# Patient Record
Sex: MALE | Race: ASIAN | NOT HISPANIC OR LATINO | URBAN - METROPOLITAN AREA
[De-identification: names, ages, dates, MRNs, and addresses within clinical notes are randomized per-mention and may not be internally consistent; named-entity substitution may affect disease eponyms.]

---

## 2019-08-01 ENCOUNTER — OUTPATIENT (OUTPATIENT)
Dept: OUTPATIENT SERVICES | Facility: HOSPITAL | Age: 74
LOS: 1 days | End: 2019-08-01
Payer: MEDICAID

## 2019-08-01 PROCEDURE — G9001: CPT

## 2019-08-15 ENCOUNTER — INPATIENT (INPATIENT)
Facility: HOSPITAL | Age: 74
LOS: 10 days | Discharge: HOME | DRG: 247 | End: 2019-08-26
Attending: HOSPITALIST | Admitting: STUDENT IN AN ORGANIZED HEALTH CARE EDUCATION/TRAINING PROGRAM
Payer: MEDICAID

## 2019-08-15 VITALS
OXYGEN SATURATION: 100 % | HEIGHT: 62 IN | RESPIRATION RATE: 14 BRPM | DIASTOLIC BLOOD PRESSURE: 84 MMHG | WEIGHT: 109.35 LBS | SYSTOLIC BLOOD PRESSURE: 184 MMHG | TEMPERATURE: 98 F | HEART RATE: 55 BPM

## 2019-08-15 DIAGNOSIS — I21.3 ST ELEVATION (STEMI) MYOCARDIAL INFARCTION OF UNSPECIFIED SITE: ICD-10-CM

## 2019-08-15 DIAGNOSIS — I21.19 ST ELEVATION (STEMI) MYOCARDIAL INFARCTION INVOLVING OTHER CORONARY ARTERY OF INFERIOR WALL: ICD-10-CM

## 2019-08-15 DIAGNOSIS — Z29.9 ENCOUNTER FOR PROPHYLACTIC MEASURES, UNSPECIFIED: ICD-10-CM

## 2019-08-15 DIAGNOSIS — I10 ESSENTIAL (PRIMARY) HYPERTENSION: ICD-10-CM

## 2019-08-15 DIAGNOSIS — R33.9 RETENTION OF URINE, UNSPECIFIED: ICD-10-CM

## 2019-08-15 LAB
ALBUMIN SERPL ELPH-MCNC: 4.1 G/DL — SIGNIFICANT CHANGE UP (ref 3.3–5)
ALP SERPL-CCNC: 83 U/L — SIGNIFICANT CHANGE UP (ref 40–120)
ALT FLD-CCNC: 22 U/L — SIGNIFICANT CHANGE UP (ref 10–45)
ANION GAP SERPL CALC-SCNC: 15 MMOL/L — SIGNIFICANT CHANGE UP (ref 5–17)
APTT BLD: >200 SEC — CRITICAL HIGH (ref 27.5–36.3)
AST SERPL-CCNC: 41 U/L — HIGH (ref 10–40)
BILIRUB SERPL-MCNC: 0.4 MG/DL — SIGNIFICANT CHANGE UP (ref 0.2–1.2)
BLD GP AB SCN SERPL QL: NEGATIVE — SIGNIFICANT CHANGE UP
BUN SERPL-MCNC: 14 MG/DL — SIGNIFICANT CHANGE UP (ref 7–23)
CALCIUM SERPL-MCNC: 9.6 MG/DL — SIGNIFICANT CHANGE UP (ref 8.4–10.5)
CHLORIDE SERPL-SCNC: 95 MMOL/L — LOW (ref 96–108)
CHOLEST SERPL-MCNC: 260 MG/DL — HIGH (ref 10–199)
CK MB BLD-MCNC: 2.4 % — SIGNIFICANT CHANGE UP (ref 0–3.5)
CK MB CFR SERPL CALC: 2.3 NG/ML — SIGNIFICANT CHANGE UP (ref 0–6.7)
CK MB CFR SERPL CALC: 2.5 NG/ML — SIGNIFICANT CHANGE UP (ref 0–6.7)
CK SERPL-CCNC: 106 U/L — SIGNIFICANT CHANGE UP (ref 30–200)
CK SERPL-CCNC: 84 U/L — SIGNIFICANT CHANGE UP (ref 30–200)
CO2 SERPL-SCNC: 25 MMOL/L — SIGNIFICANT CHANGE UP (ref 22–31)
CREAT SERPL-MCNC: 1.17 MG/DL — SIGNIFICANT CHANGE UP (ref 0.5–1.3)
GLUCOSE SERPL-MCNC: 120 MG/DL — HIGH (ref 70–99)
HBA1C BLD-MCNC: 6.3 % — HIGH (ref 4–5.6)
HCT VFR BLD CALC: 46 % — SIGNIFICANT CHANGE UP (ref 39–50)
HDLC SERPL-MCNC: 58 MG/DL — SIGNIFICANT CHANGE UP
HGB BLD-MCNC: 15.2 G/DL — SIGNIFICANT CHANGE UP (ref 13–17)
INR BLD: 1.07 RATIO — SIGNIFICANT CHANGE UP (ref 0.88–1.16)
LIPID PNL WITH DIRECT LDL SERPL: 166 MG/DL — HIGH
MAGNESIUM SERPL-MCNC: 2.1 MG/DL — SIGNIFICANT CHANGE UP (ref 1.6–2.6)
MCHC RBC-ENTMCNC: 30.7 PG — SIGNIFICANT CHANGE UP (ref 27–34)
MCHC RBC-ENTMCNC: 33 GM/DL — SIGNIFICANT CHANGE UP (ref 32–36)
MCV RBC AUTO: 93.1 FL — SIGNIFICANT CHANGE UP (ref 80–100)
PHOSPHATE SERPL-MCNC: 2.2 MG/DL — LOW (ref 2.5–4.5)
PLATELET # BLD AUTO: 197 K/UL — SIGNIFICANT CHANGE UP (ref 150–400)
POTASSIUM SERPL-MCNC: 3.9 MMOL/L — SIGNIFICANT CHANGE UP (ref 3.5–5.3)
POTASSIUM SERPL-SCNC: 3.9 MMOL/L — SIGNIFICANT CHANGE UP (ref 3.5–5.3)
PROT SERPL-MCNC: 7.8 G/DL — SIGNIFICANT CHANGE UP (ref 6–8.3)
PROTHROM AB SERPL-ACNC: 12.3 SEC — SIGNIFICANT CHANGE UP (ref 10–12.9)
RBC # BLD: 4.94 M/UL — SIGNIFICANT CHANGE UP (ref 4.2–5.8)
RBC # FLD: 11.9 % — SIGNIFICANT CHANGE UP (ref 10.3–14.5)
RH IG SCN BLD-IMP: POSITIVE — SIGNIFICANT CHANGE UP
SODIUM SERPL-SCNC: 135 MMOL/L — SIGNIFICANT CHANGE UP (ref 135–145)
TOTAL CHOLESTEROL/HDL RATIO MEASUREMENT: 4.5 RATIO — SIGNIFICANT CHANGE UP (ref 3.4–9.6)
TRIGL SERPL-MCNC: 182 MG/DL — HIGH (ref 10–149)
TROPONIN T, HIGH SENSITIVITY RESULT: 15 NG/L — SIGNIFICANT CHANGE UP (ref 0–51)
TROPONIN T, HIGH SENSITIVITY RESULT: 16 NG/L — SIGNIFICANT CHANGE UP (ref 0–51)
TSH SERPL-MCNC: 2.38 UIU/ML — SIGNIFICANT CHANGE UP (ref 0.27–4.2)
WBC # BLD: 8.7 K/UL — SIGNIFICANT CHANGE UP (ref 3.8–10.5)
WBC # FLD AUTO: 8.7 K/UL — SIGNIFICANT CHANGE UP (ref 3.8–10.5)

## 2019-08-15 PROCEDURE — 99223 1ST HOSP IP/OBS HIGH 75: CPT | Mod: GC

## 2019-08-15 PROCEDURE — 99223 1ST HOSP IP/OBS HIGH 75: CPT

## 2019-08-15 PROCEDURE — 93010 ELECTROCARDIOGRAM REPORT: CPT

## 2019-08-15 PROCEDURE — 93306 TTE W/DOPPLER COMPLETE: CPT | Mod: 26

## 2019-08-15 PROCEDURE — 93458 L HRT ARTERY/VENTRICLE ANGIO: CPT | Mod: 26,59,GC

## 2019-08-15 PROCEDURE — 99152 MOD SED SAME PHYS/QHP 5/>YRS: CPT | Mod: GC

## 2019-08-15 PROCEDURE — 92941 PRQ TRLML REVSC TOT OCCL AMI: CPT | Mod: LC,GC

## 2019-08-15 RX ORDER — HALOPERIDOL DECANOATE 100 MG/ML
2.5 INJECTION INTRAMUSCULAR ONCE
Refills: 0 | Status: COMPLETED | OUTPATIENT
Start: 2019-08-15 | End: 2019-08-15

## 2019-08-15 RX ORDER — HALOPERIDOL DECANOATE 100 MG/ML
2.5 INJECTION INTRAMUSCULAR ONCE
Refills: 0 | Status: DISCONTINUED | OUTPATIENT
Start: 2019-08-15 | End: 2019-08-15

## 2019-08-15 RX ORDER — CHLORHEXIDINE GLUCONATE 213 G/1000ML
1 SOLUTION TOPICAL AT BEDTIME
Refills: 0 | Status: DISCONTINUED | OUTPATIENT
Start: 2019-08-15 | End: 2019-08-21

## 2019-08-15 RX ORDER — POTASSIUM PHOSPHATE, MONOBASIC POTASSIUM PHOSPHATE, DIBASIC 236; 224 MG/ML; MG/ML
15 INJECTION, SOLUTION INTRAVENOUS ONCE
Refills: 0 | Status: COMPLETED | OUTPATIENT
Start: 2019-08-15 | End: 2019-08-15

## 2019-08-15 RX ORDER — HEPARIN SODIUM 5000 [USP'U]/ML
5000 INJECTION INTRAVENOUS; SUBCUTANEOUS EVERY 8 HOURS
Refills: 0 | Status: DISCONTINUED | OUTPATIENT
Start: 2019-08-15 | End: 2019-08-16

## 2019-08-15 RX ORDER — TICAGRELOR 90 MG/1
90 TABLET ORAL EVERY 12 HOURS
Refills: 0 | Status: DISCONTINUED | OUTPATIENT
Start: 2019-08-15 | End: 2019-08-26

## 2019-08-15 RX ORDER — HEPARIN SODIUM 5000 [USP'U]/ML
2900 INJECTION INTRAVENOUS; SUBCUTANEOUS EVERY 6 HOURS
Refills: 0 | Status: DISCONTINUED | OUTPATIENT
Start: 2019-08-15 | End: 2019-08-15

## 2019-08-15 RX ORDER — RANITIDINE HYDROCHLORIDE 150 MG/1
1 TABLET, FILM COATED ORAL
Qty: 0 | Refills: 0 | DISCHARGE

## 2019-08-15 RX ORDER — FENTANYL CITRATE 50 UG/ML
50 INJECTION INTRAVENOUS ONCE
Refills: 0 | Status: DISCONTINUED | OUTPATIENT
Start: 2019-08-15 | End: 2019-08-15

## 2019-08-15 RX ORDER — ATORVASTATIN CALCIUM 80 MG/1
80 TABLET, FILM COATED ORAL AT BEDTIME
Refills: 0 | Status: DISCONTINUED | OUTPATIENT
Start: 2019-08-15 | End: 2019-08-26

## 2019-08-15 RX ORDER — MIDAZOLAM HYDROCHLORIDE 1 MG/ML
2 INJECTION, SOLUTION INTRAMUSCULAR; INTRAVENOUS ONCE
Refills: 0 | Status: DISCONTINUED | OUTPATIENT
Start: 2019-08-15 | End: 2019-08-15

## 2019-08-15 RX ORDER — POTASSIUM CHLORIDE 20 MEQ
20 PACKET (EA) ORAL ONCE
Refills: 0 | Status: DISCONTINUED | OUTPATIENT
Start: 2019-08-15 | End: 2019-08-15

## 2019-08-15 RX ORDER — TAMSULOSIN HYDROCHLORIDE 0.4 MG/1
0.4 CAPSULE ORAL AT BEDTIME
Refills: 0 | Status: DISCONTINUED | OUTPATIENT
Start: 2019-08-15 | End: 2019-08-26

## 2019-08-15 RX ORDER — HALOPERIDOL DECANOATE 100 MG/ML
2.5 INJECTION INTRAMUSCULAR ONCE
Refills: 0 | Status: COMPLETED | OUTPATIENT
Start: 2019-08-15 | End: 2019-08-16

## 2019-08-15 RX ORDER — ASPIRIN/CALCIUM CARB/MAGNESIUM 324 MG
81 TABLET ORAL DAILY
Refills: 0 | Status: DISCONTINUED | OUTPATIENT
Start: 2019-08-15 | End: 2019-08-26

## 2019-08-15 RX ORDER — HEPARIN SODIUM 5000 [USP'U]/ML
INJECTION INTRAVENOUS; SUBCUTANEOUS
Qty: 25000 | Refills: 0 | Status: DISCONTINUED | OUTPATIENT
Start: 2019-08-15 | End: 2019-08-15

## 2019-08-15 RX ORDER — ALPRAZOLAM 0.25 MG
0.12 TABLET ORAL ONCE
Refills: 0 | Status: DISCONTINUED | OUTPATIENT
Start: 2019-08-15 | End: 2019-08-15

## 2019-08-15 RX ADMIN — ATORVASTATIN CALCIUM 80 MILLIGRAM(S): 80 TABLET, FILM COATED ORAL at 22:09

## 2019-08-15 RX ADMIN — TICAGRELOR 90 MILLIGRAM(S): 90 TABLET ORAL at 17:47

## 2019-08-15 RX ADMIN — FENTANYL CITRATE 50 MICROGRAM(S): 50 INJECTION INTRAVENOUS at 03:10

## 2019-08-15 RX ADMIN — Medication 0.12 MILLIGRAM(S): at 03:30

## 2019-08-15 RX ADMIN — MIDAZOLAM HYDROCHLORIDE 2 MILLIGRAM(S): 1 INJECTION, SOLUTION INTRAMUSCULAR; INTRAVENOUS at 04:30

## 2019-08-15 RX ADMIN — HEPARIN SODIUM 5000 UNIT(S): 5000 INJECTION INTRAVENOUS; SUBCUTANEOUS at 22:09

## 2019-08-15 RX ADMIN — FENTANYL CITRATE 50 MICROGRAM(S): 50 INJECTION INTRAVENOUS at 03:45

## 2019-08-15 RX ADMIN — HALOPERIDOL DECANOATE 2.5 MILLIGRAM(S): 100 INJECTION INTRAMUSCULAR at 03:50

## 2019-08-15 RX ADMIN — HALOPERIDOL DECANOATE 2.5 MILLIGRAM(S): 100 INJECTION INTRAMUSCULAR at 04:00

## 2019-08-15 RX ADMIN — TICAGRELOR 90 MILLIGRAM(S): 90 TABLET ORAL at 05:45

## 2019-08-15 RX ADMIN — TAMSULOSIN HYDROCHLORIDE 0.4 MILLIGRAM(S): 0.4 CAPSULE ORAL at 22:09

## 2019-08-15 RX ADMIN — Medication 81 MILLIGRAM(S): at 11:41

## 2019-08-15 RX ADMIN — POTASSIUM PHOSPHATE, MONOBASIC POTASSIUM PHOSPHATE, DIBASIC 62.5 MILLIMOLE(S): 236; 224 INJECTION, SOLUTION INTRAVENOUS at 04:35

## 2019-08-15 NOTE — H&P ADULT - ATTENDING COMMENTS
Patient is seen and examined with fellow, NP and the CCU house-staff. I agree with the history, physical and the assessment and plan.  STEMI s/p PCI with DIAZ to LCx; will need a staged PCI  - educated on the importance of DAPT   - Ace-inhibitor initiated  - Beta-blocker initiated  - patient is on Lipitor 80 mg daily  - trend cardiac enzymes  - TTE prior to discharge

## 2019-08-15 NOTE — H&P ADULT - NSHPSOCIALHISTORY_GEN_ALL_CORE
The patient lives with his daughter, son-in-law, wife and grandchildren.   He denies alcohol, drug or smoking history.   he recently moved from [facility] in June 2019.  As per daughter, after CVA, patient has become forgetful.

## 2019-08-15 NOTE — H&P ADULT - NSICDXPASTMEDICALHX_GEN_ALL_CORE_FT
PAST MEDICAL HISTORY:  Cerebrovascular accident (CVA)     Essential hypertension     GERD (gastroesophageal reflux disease)

## 2019-08-15 NOTE — H&P ADULT - ASSESSMENT
74 year old Urdu-speaking male with PMH of HTN, CVA with no residual neurologic deficits, and GERD presents from [facility] for an IWSTEMI, admitted for ACS protocol and ischemic work up.    #Neuro  - mentating well  - no active issues    #Pulmonary  - patient breathing without difficulty on supplemental oxygen  - saturating %; will wean   - CXR at [facility] clear; left lower lobe crackles on examination may be atelectasis    #Cardiovascular  IWSTEMI  - s/p ASA and brilinta load   - continue with ASA, brilinta and heparin gtt as per ACS protocol  - continue with statin  - patient hypertensive with SBP in 180s  - will ACEI and beta blocker as blood pressure and HR permits  - continue to trend cardiac enzymes and EKG    HTN  - holding amlodipine in setting of IWSTEMI    #GI  - DASH/TLC diet    Constipation?  - abdomen distended on examination  - abdominal distension is new as per patient  - patient cannot remember the last time he had a bowel movement  - bowel regimen as needed    #Renal  - No active issues    #ppx  - DVT: heparin 74 year old Portuguese-speaking male with PMH of HTN, CVA with no residual neurologic deficits, and GERD presents from [facility] for an IWSTEMI, admitted for ACS protocol and ischemic work up.    #Neuro  - mentating well  - no active issues    #Pulmonary  - patient breathing without difficulty on supplemental oxygen  - saturating %; will wean as tolerated  - CXR at [facility] clear; left lower lobe crackles on examination may be atelectasis    #Cardiovascular  IWSTEMI  - s/p ASA and brilinta load   - continue with ASA, brilinta and heparin gtt as per ACS protocol  - continue with statin  - patient hypertensive with SBP in 180s  - will begin ACEI and beta blocker as blood pressure and HR permits  - continue to trend cardiac enzymes and EKG    HTN  - holding amlodipine in setting of IWSTEMI    #GI  - DASH/TLC diet    Constipation?  - abdomen distended on examination  - abdominal distension is new as per patient  - patient cannot remember the last time he had a bowel movement  - bowel regimen as needed    #Renal  - No active issues    #ppx  - DVT: heparin 74 year old Slovenian-speaking male with PMH of HTN, CVA with no residual neurologic deficits, and GERD presents from [facility] for an IWSTEMI, admitted for ACS protocol and ischemic work up.    #Neuro  - mentating well  - no active issues    #Pulmonary  - patient breathing without difficulty on supplemental oxygen  - saturating %; will wean as tolerated  - CXR at [facility] clear; left lower lobe crackles on examination may be atelectasis    #Cardiovascular  IWSTEMI  - s/p ASA and brilinta load   - echo ordered  - continue with ASA, brilinta and heparin gtt as per ACS protocol  - continue with statin  - patient hypertensive with SBP in 180s  - will begin ACEI and beta blocker as blood pressure and HR permits  - continue to trend cardiac enzymes and EKG    HTN  - holding amlodipine in setting of IWSTEMI    #GI  - DASH/TLC diet    Constipation?  - abdomen distended on examination  - abdominal distension is new as per patient  - patient cannot remember the last time he had a bowel movement  - bowel regimen as needed    #Renal  - No active issues    #ppx  - DVT: heparin 74 year old Persian-speaking male with PMH of HTN, CVA with no residual neurologic deficits, and GERD presents from [facility] for an IWSTEMI, admitted for ACS protocol and ischemic work up.    #Neuro  - mentating well  - no active issues    #Pulmonary  - patient breathing without difficulty on supplemental oxygen  - saturating %; will wean as tolerated  - CXR at [facility] clear; left lower lobe crackles on examination may be atelectasis    #Cardiovascular  IWSTEMI  - s/p ASA and brilinta load   - [facility] EF 65%, 90% pLCX s/p DIAZ x1, 90% LAD, 90% Ramus  - plan for possible staged PCI of LAD and Ramus tomorrow   - echo ordered  - continue with ASA, brilinta and heparin gtt as per ACS protocol  - continue with statin  - will begin ACEI and beta blocker as blood pressure and HR permits  - continue to trend cardiac enzymes and EKG    HTN  - holding amlodipine in setting of IWSTEMI    #GI  - DASH/TLC diet    Constipation?  - abdomen distended on examination  - abdominal distension is new as per patient  - patient cannot remember the last time he had a bowel movement  - bowel regimen as needed    #Renal  Urinary retention  - bladder scan with approximately 1L of fluid  - madrid placed    #ppx  - DVT: heparin 74 year old Maori-speaking male with PMH of HTN, CVA with no residual neurologic deficits, and GERD presents from [facility] for an IWSTEMI, admitted for ACS protocol and ischemic work up, s/p C with 90% pLCX s/p DIAZ x1.     #Neuro  Agitation  - after cath, patient became agitated requiring xanax 0.125 mg x1, 2.5 IV haldol x2, and versed 2mg IV x1.  - possible dementia component  - constant observation  - if agitation continues, consult Psychiatry    #Pulmonary  - patient breathing without difficulty on supplemental oxygen  - saturating %; will wean as tolerated  - CXR at [facility] clear; left lower lobe crackles on examination may be atelectasis    #Cardiovascular  IWSTEMI  - s/p ASA and brilinta load   - [facility] EF 65%, 90% pLCX s/p DIAZ x1, 90% LAD, 90% Ramus  - plan for possible staged PCI of LAD and Ramus tomorrow   - echo ordered  - continue with ASA, brilinta and heparin gtt as per ACS protocol  - continue with statin  - will begin ACEI and beta blocker as blood pressure and HR permits  - continue to trend cardiac enzymes and EKG    HTN  - holding amlodipine in setting of IWSTEMI    #GI  - DASH/TLC diet    Constipation?  - abdomen distended on examination  - abdominal distension is new as per patient  - patient cannot remember the last time he had a bowel movement  - bowel regimen as needed    #Renal  Urinary retention  - bladder scan with approximately 1L of fluid  - madrid placed    #ppx  - DVT: heparin 74 year old Slovenian-speaking male with PMH of HTN, CVA with no residual neurologic deficits, and GERD presents from [facility] for an IWSTEMI, admitted for ACS protocol and ischemic work up, s/p C with 90% pLCX s/p DIAZ x1.     #Neuro  Agitation  - after cath, patient became agitated requiring xanax 0.125 mg x1, 2.5 IV haldol x2, and versed 2mg IV x1.  - possible dementia component  - constant observation  - if agitation continues, consult Psychiatry    #Pulmonary  - patient breathing without difficulty on supplemental oxygen  - saturating %; will wean as tolerated  - CXR at [facility] clear; left lower lobe crackles on examination may be atelectasis    #Cardiovascular  IWSTEMI  - s/p ASA and brilinta load   - [facility] EF 65%, 90% pLCX s/p DIAZ x1, 90% LAD, 90% Ramus  - plan for possible staged PCI of LAD and Ramus tomorrow   - echo ordered  - continue with ASA, brilinta and heparin gtt as per ACS protocol  - continue with statin  - will begin ACEI and beta blocker as blood pressure and HR permits  - continue to trend cardiac enzymes and EKG    HTN  - holding amlodipine in setting of IWSTEMI    #GI  - DASH/TLC diet    Constipation?  - abdomen distended on examination  - abdominal distension is new as per patient  - patient cannot remember the last time he had a bowel movement  - bowel regimen as needed    #Renal  Urinary retention  - bladder scan with approximately 1L of fluid  - madrid placed    #ppx  - DVT: heparin      --  Fellow addendum    Agree with R2 H&P above with following in additions. In short, pt is a 74 year old Slovenian-speaking male with PMH of HTN, CVA with no residual neurologic deficits, and GERD presents from [facility] for an IWSTEMI, found to have 90% LCx s/p PCI.  Pt also with disease in ramus and LAD, will discuss mgmt options regarding further PCI vs medical therapy.  C/w ASA/Brilinta, statin.  Pt still bradycardic, will hold BB for now and resume as tolerated.  Will add on CCB for BP as needed.  Remaining plan as per R2.    Anselmo Ojeda, PGY4 74 year old Yi-speaking male with PMH of HTN, CVA with no residual neurologic deficits, and GERD presents from [facility] for an IWSTEMI, admitted for ACS protocol and ischemic work up, s/p LHC with 90% pLCX s/p DIAZ x1.     #Neuro  Agitation  - after cath, patient became agitated requiring xanax 0.125 mg x1, 2.5 IV haldol x2, and versed 2mg IV x1.  - possible dementia component  - constant observation  - if agitation continues, consult Psychiatry    #Pulmonary  - patient breathing without difficulty on supplemental oxygen  - saturating %; will wean as tolerated  - CXR at [facility] clear; left lower lobe crackles on examination may be atelectasis    #Cardiovascular  IWSTEMI  - s/p ASA and brilinta load   - [facility] EF 65%, 90% pLCX s/p DIAZ x1, 90% LAD, 90% Ramus  - plan for possible staged PCI of LAD and Ramus tomorrow   - echo ordered  - continue with ASA and brilinta, heparin gtt discontinued after [facility]  - continue with statin  - will begin ACEI and beta blocker as blood pressure and HR permits  - continue to trend cardiac enzymes and EKG    HTN  - holding amlodipine in setting of IWSTEMI    #GI  - DASH/TLC diet    Constipation?  - abdomen distended on examination  - abdominal distension is new as per patient  - patient cannot remember the last time he had a bowel movement  - bowel regimen as needed    #Renal  Urinary retention  - bladder scan with approximately 1L of fluid  - madrid placed      Joleen Ramirez MD PGY-2    --  Fellow addendum    Agree with R2 H&P above with following in additions. In short, pt is a 74 year old Yi-speaking male with PMH of HTN, CVA with no residual neurologic deficits, and GERD presents from [facility] for an IWSTEMI, found to have 90% LCx s/p PCI.  Pt also with disease in ramus and LAD, will discuss mgmt options regarding further PCI vs medical therapy.  C/w ASA/Brilinta, statin.  Pt still bradycardic, will hold BB for now and resume as tolerated.  Will add on CCB for BP as needed.  Remaining plan as per R2.    Anselmo Ojeda, PGY4 74 year old Pashto-speaking male with PMH of HTN, CVA with no residual neurologic deficits, and GERD presents from [facility] for an IWSTEMI, admitted for ACS protocol and ischemic work up, s/p C with 90% pLCX s/p DIAZ x1.     #Neuro  Agitation  - after cath, patient became agitated requiring xanax 0.125 mg x1, 2.5 IV haldol x2, and versed 2mg IV x1.  - possible dementia component  - constant observation  - if agitation continues, consult Psychiatry    #Pulmonary  - patient breathing without difficulty on supplemental oxygen  - saturating %; will wean as tolerated  - CXR at [facility] clear; left lower lobe crackles on examination may be atelectasis    #Cardiovascular  IWSTEMI  - s/p ASA and brilinta load   - [facility] EF 65%, 90% pLCX s/p DIAZ x1, 90% LAD, 90% Ramus  - plan for possible staged PCI of LAD and Ramus tomorrow   - echo ordered  - continue with ASA and brilinta, heparin gtt discontinued after [facility]  - continue with statin  - will begin ACEI and beta blocker as blood pressure and HR permits  - continue to trend cardiac enzymes and EKG    HTN  - holding amlodipine in setting of IWSTEMI    #GI  - DASH/TLC diet    Constipation?  - abdomen distended on examination  - abdominal distension is new as per patient  - patient cannot remember the last time he had a bowel movement  - bowel regimen as needed    #Renal  Urinary retention  - bladder scan with approximately 1L of fluid  - madrid placed    #Endocrine  Pre-diabetes  - A1C 6.3  - treat with lifestyle modification  - monitor serum glucose    Joleen Ramirez MD PGY-2    --  Fellow addendum    Agree with R2 H&P above with following in additions. In short, pt is a 74 year old Pashto-speaking male with PMH of HTN, CVA with no residual neurologic deficits, and GERD presents from [facility] for an IWSTEMI, found to have 90% LCx s/p PCI.  Pt also with disease in ramus and LAD, will discuss mgmt options regarding further PCI vs medical therapy.  C/w ASA/Brilinta, statin.  Pt still bradycardic, will hold BB for now and resume as tolerated.  Will add on CCB for BP as needed.  Remaining plan as per R2.    Anselmo Ojeda, PGY4

## 2019-08-15 NOTE — PROGRESS NOTE ADULT - SUBJECTIVE AND OBJECTIVE BOX
CC: chest pain, epigastric pain    HPI: 74 year old Welsh-speaking male with PMH of HTN, CVA (2017) with no residual deficits and GERD presents from [facility] for an IWSTEMI. For the past week, the patient has been having slight chest pain which he describes as burning. He went to his doctor who thought the chest pain was GI related, and was prescribed ranitidine and Antacid Plus. These medications did not improve his symptoms. His chest pain worsened with use of these medications. He was also noted to be newly hypertensive, so he was prescribed amlodipine. Yesterday at 6:00 pm, he started to experience 10/10 burning pain that radiated to his abdomen and back. The patient also reports associated dizziness. The pain was not associated with shortness of breath, diaphoresis, jaw pain, shoulder pain, nausea or vomiting. At 10:30 pm, he went to [facility] where he was found to be bradycardic (HR 57 bpm) and hypertensive (/81 mm Hg). EKG showed ST elevations in II, III, and AVF. He was loaded with ASA and brilinta, and was given a heparin bolus. He was transferred to [facility] for further management. [facility] today showed EF 65%, 90% pLCX s/p DIAZ x1, 90% LAD, 90% Ramus. Pt being monitored overnight with possibility for staged PCI in am.  Pt noted after cath to still be mildly sinus alfa intermittently on tele. Currently, pt denies cp/sob/f/c, n/v/d, dysuria, cough. No LE edema or orthopnea.  Daughter at bedside providing translation.     REVIEW OF SYSTEMS:  CONSTITUTIONAL: No weakness. No fevers. No chills. No weight loss. Good appetite.  EYES: No visual changes. No eye pain.  ENT: No hearing difficulty. No vertigo. No dysphagia. No Sinusitis/rhinorrhea.  NECK: No pain. No stiffness/rigidity.  CARDIAC: No chest pain. No palpitations.  RESPIRATORY: No cough. No SOB. No hemoptysis.  GASTROINTESTINAL: No abdominal pain. No nausea. No vomiting. No hematemesis. No diarrhea. No constipation. No melena. No hematochezia.  GENITOURINARY: No dysuria. No frequency. No hesitancy. No hematuria.  NEUROLOGICAL: No numbness. No focal weakness. No incontinence. No headache.  BACK: No back pain.  EXTREMITIES: No lower extremity edema. Full ROM.  SKIN: No rashes. No itching. No other lesions.  PSYCHIATRIC: No depression. No anxiety. No SI/HI.  ALLERGIC: No lip swelling. No hives.  All other review of systems is negative unless indicated above.  [  ] Unable to assess ROS because    PAST MEDICAL & SURGICAL HISTORY:  Cerebrovascular accident (CVA)  GERD (gastroesophageal reflux disease)  Essential hypertension  No significant past surgical history    FAMILY HISTORY:  Family hx of hypertension    Social History:    Marital Status:  (x   )    (   ) Single    (   )    (  )   Occupation: retired  Lives with: (  ) alone  (  ) children   ( x ) spouse   (  ) parents  (  ) other    Substance Use (street drugs): (x  ) never used  (  ) other:  Tobacco Usage:  ( x  ) never smoked   (   ) former smoker   (   ) current smoker  (     ) pack year  (        ) last cigarette date  Alcohol Usage: denies      MEDICATIONS  (STANDING):  aspirin  chewable 81 milliGRAM(s) Oral daily  atorvastatin 80 milliGRAM(s) Oral at bedtime  chlorhexidine 2% Cloths 1 Application(s) Topical at bedtime  heparin  Injectable 5000 Unit(s) SubCutaneous every 8 hours  tamsulosin 0.4 milliGRAM(s) Oral at bedtime  ticagrelor 90 milliGRAM(s) Oral every 12 hours    MEDICATIONS  (PRN):      MEDICATIONS  (PRN):    Allergies    No Known Allergies    Intolerances      Vital Signs Last 24 Hrs  T(C): 37.3 (15 Aug 2019 20:24), Max: 37.3 (15 Aug 2019 20:24)  T(F): 99.1 (15 Aug 2019 20:24), Max: 99.1 (15 Aug 2019 20:24)  HR: 87 (15 Aug 2019 20:24) (48 - 100)  BP: 136/78 (15 Aug 2019 20:24) (79/55 - 186/95)  BP(mean): 73 (15 Aug 2019 18:15) (60 - 122)  RR: 18 (15 Aug 2019 20:24) (12 - 37)  SpO2: 97% (15 Aug 2019 20:24) (96% - 100%)    PHYSICAL EXAM:  GENERAL: NAD, well-groomed, elderly E[place] male.   HEAD:  Atraumatic, Normocephalic  EYES: EOMI, PERRLA, conjunctiva and sclera clear  ENMT: No tonsillar erythema, exudates, or enlargement; Moist mucous membranes, Good dentition, No lesions  NECK: Supple, No JVD, Normal thyroid  CHEST/LUNG: Clear to percussion bilaterally; No rales, rhonchi, wheezing, or rubs no resp distress or accessory muscle usage  HEART: Regular rate and rhythm; No murmurs, rubs, or gallops, no sig Le edema  ABDOMEN: Soft, Nontender, Nondistended; Bowel sounds present, no rebound/guarding  EXTREMITIES:  2+ Peripheral Pulses, No clubbing, cyanosis  LYMPH: No lymphadenopathy noted, no lymphangitis  SKIN: No rashes or lesions, no petechiae  NERVOUS SYSTEM:  Alert & Oriented X3, Good concentration; Motor Strength 5/5 B/L upper and lower extremities    Labs personally reviewed                     15.2   8.7   )-----------( 197      ( 15 Aug 2019 01:26 )             46.0       135  |  95<L>  |  14  ----------------------------<  120<H>  3.9   |  25  |  1.17    Ca    9.6      15 Aug 2019 01:26  Phos  2.2     08-15  Mg     2.1     08-15    TPro  7.8  /  Alb  4.1  /  TBili  0.4  /  DBili  x   /  AST  41<H>  /  ALT  22  /  AlkPhos  83  08-15      PT/INR - ( 15 Aug 2019 01:26 )   PT: 12.3 sec;   INR: 1.07 ratio         PTT - ( 15 Aug 2019 01:26 )  PTT:>200.0 sec                  CARDIAC MARKERS ( 15 Aug 2019 09:50 )  x     / x     / 84 U/L / x     / 2.3 ng/mL  CARDIAC MARKERS ( 15 Aug 2019 01:26 )  x     / x     / 106 U/L / x     / 2.5 ng/mL    Troponin T, High Sensitivity Result: 15: Rapid upward or downward changes in high-sensitivity troponin levels  suggest acute myocardial injury. Renal impairment may cause sustained  troponin elevations.  Normal: <6 - 14 ng/L  Indeterminate: 15-51 ng/L  Elevated: > 51 ng/L  See http://labs/test/[email or web address] on the [facility] intranet for more  information ng/L (08.15.19 @ 09:50)    Troponin T, High Sensitivity (08.15.19 @ 01:26)    Troponin T, High Sensitivity Result: 16: Rapid upward or downward changes in high-sensitivity troponin levels  suggest acute myocardial injury. Renal impairment may cause sustained  troponin elevations.  Normal: <6 - 14 ng/L  Indeterminate: 15-51 ng/L  Elevated: > 51 ng/L  See [email or web address]/test/[email or web address] on the [facility] intranet for more  information ng/L    Hemoglobin A1C, Whole Blood (08.15.19 @ 02:21)    Hemoglobin A1C, Whole Blood: 6.3: Method: Immunoassay    Lipid Profile (08.15.19 @ 02:21)    Total Cholesterol/HDL Ratio Measurement: 4.5 RATIO    Cholesterol, Serum: 260 mg/dL    Triglycerides, Serum: 182 mg/dL    HDL Cholesterol, Serum: 58: HDL Levels >/= 60 mg/dL are considered beneficial and a "negative" risk  factor.  Effective 08/15/2018: New reference range and interpretive comment. mg/dL    Direct LDL: 166: LDL Cholesterol (mg/dL) --- Interpretive Comment (for adults 18 and over)    Thyroid Stimulating Hormone, Serum (08.15.19 @ 02:21)    Thyroid Stimulating Hormone, Serum: 2.38 uIU/mL    Imaging personally reviewed   < from: TTE with Doppler (w/Cont) (08.15.19 @ 08:32) >  Conclusions:  1. Mitral annular calcification, otherwise normal mitral  valve. Minimal mitral regurgitation.  2. Normal trileaflet aortic valve. Minimal aortic  regurgitation.  3. Hyperdynamic left ventricular systolic function.  Endocardial visualization enhanced with intravenous  injection of Ultrasonic Enhancing Agent (Definity). No left  ventricular thrombus.  4. Normal diastolic function Reversal of the E-A waves of  the mitral inflow pattern consistent with reduced  compliance of the left ventricle.  5. Normal right ventricular size and function.  6. Normal pericardium with no pericardial effusion.  *** No previous Echo exam.    < end of copied text >    EKG personally reviewed [facility] Internal medicine accept note    CC: chest pain, epigastric pain    HPI: 74 year old Sinhala-speaking male with PMH of HTN, CVA (2017) with no residual deficits and GERD presents from [facility] for an IWSTEMI. For the past week, the patient has been having slight chest pain which he describes as burning. He went to his doctor who thought the chest pain was GI related, and was prescribed ranitidine and Antacid Plus. These medications did not improve his symptoms. His chest pain worsened with use of these medications. He was also noted to be newly hypertensive, so he was prescribed amlodipine. Yesterday at 6:00 pm, he started to experience 10/10 burning pain that radiated to his abdomen and back. The patient also reports associated dizziness. The pain was not associated with shortness of breath, diaphoresis, jaw pain, shoulder pain, nausea or vomiting. At 10:30 pm, he went to [facility] where he was found to be bradycardic (HR 57 bpm) and hypertensive (/81 mm Hg). EKG showed ST elevations in II, III, and AVF. He was loaded with ASA and brilinta, and was given a heparin bolus. He was transferred to [facility] for further management. [facility] today showed EF 65%, 90% pLCX s/p DIAZ x1, 90% LAD, 90% Ramus. Pt being monitored overnight with possibility for staged PCI in am.  Pt noted after cath to still be mildly sinus alfa intermittently on tele. Currently, pt denies cp/sob/f/c, n/v/d, dysuria, cough. No LE edema or orthopnea.  Daughter at bedside providing translation.     REVIEW OF SYSTEMS:  CONSTITUTIONAL: No weakness. No fevers. No chills. No weight loss. Good appetite.  EYES: No visual changes. No eye pain.  ENT: No hearing difficulty. No vertigo. No dysphagia. No Sinusitis/rhinorrhea.  NECK: No pain. No stiffness/rigidity.  CARDIAC: No chest pain. No palpitations.  RESPIRATORY: No cough. No SOB. No hemoptysis.  GASTROINTESTINAL: No abdominal pain. No nausea. No vomiting. No hematemesis. No diarrhea. No constipation. No melena. No hematochezia.  GENITOURINARY: No dysuria. No frequency. No hesitancy. No hematuria.  NEUROLOGICAL: No numbness. No focal weakness. No incontinence. No headache.  BACK: No back pain.  EXTREMITIES: No lower extremity edema. Full ROM.  SKIN: No rashes. No itching. No other lesions.  PSYCHIATRIC: No depression. No anxiety. No SI/HI.  ALLERGIC: No lip swelling. No hives.  All other review of systems is negative unless indicated above.  [  ] Unable to assess ROS because    PAST MEDICAL & SURGICAL HISTORY:  Cerebrovascular accident (CVA)  GERD (gastroesophageal reflux disease)  Essential hypertension  No significant past surgical history    FAMILY HISTORY:  Family hx of hypertension    Social History:    Marital Status:  (x   )    (   ) Single    (   )    (  )   Occupation: retired  Lives with: (  ) alone  (  ) children   ( x ) spouse   (  ) parents  (  ) other    Substance Use (street drugs): (x  ) never used  (  ) other:  Tobacco Usage:  ( x  ) never smoked   (   ) former smoker   (   ) current smoker  (     ) pack year  (        ) last cigarette date  Alcohol Usage: denies      MEDICATIONS  (STANDING):  aspirin  chewable 81 milliGRAM(s) Oral daily  atorvastatin 80 milliGRAM(s) Oral at bedtime  chlorhexidine 2% Cloths 1 Application(s) Topical at bedtime  heparin  Injectable 5000 Unit(s) SubCutaneous every 8 hours  tamsulosin 0.4 milliGRAM(s) Oral at bedtime  ticagrelor 90 milliGRAM(s) Oral every 12 hours    MEDICATIONS  (PRN):      MEDICATIONS  (PRN):    Allergies    No Known Allergies    Intolerances      Vital Signs Last 24 Hrs  T(C): 37.3 (15 Aug 2019 20:24), Max: 37.3 (15 Aug 2019 20:24)  T(F): 99.1 (15 Aug 2019 20:24), Max: 99.1 (15 Aug 2019 20:24)  HR: 87 (15 Aug 2019 20:24) (48 - 100)  BP: 136/78 (15 Aug 2019 20:24) (79/55 - 186/95)  BP(mean): 73 (15 Aug 2019 18:15) (60 - 122)  RR: 18 (15 Aug 2019 20:24) (12 - 37)  SpO2: 97% (15 Aug 2019 20:24) (96% - 100%)    PHYSICAL EXAM:  GENERAL: NAD, well-groomed, elderly [place] Islander male.   HEAD:  Atraumatic, Normocephalic  EYES: EOMI, PERRLA, conjunctiva and sclera clear  ENMT: No tonsillar erythema, exudates, or enlargement; Moist mucous membranes, Good dentition, No lesions  NECK: Supple, No JVD, Normal thyroid  CHEST/LUNG: Clear to percussion bilaterally; No rales, rhonchi, wheezing, or rubs no resp distress or accessory muscle usage  HEART: Regular rate and rhythm; No murmurs, rubs, or gallops, no sig Le edema  ABDOMEN: Soft, Nontender, Nondistended; Bowel sounds present, no rebound/guarding  EXTREMITIES:  2+ Peripheral Pulses, No clubbing, cyanosis. +R groin ecchymosis no recurrent hematoma. Neurovasc intact. Well perfused.   LYMPH: No lymphadenopathy noted, no lymphangitis  SKIN: No rashes or lesions, no petechiae  NERVOUS SYSTEM:  Alert & Oriented X3, Good concentration; Motor Strength 5/5 B/L upper and lower extremities    Labs personally reviewed                     15.2   8.7   )-----------( 197      ( 15 Aug 2019 01:26 )             46.0       135  |  95<L>  |  14  ----------------------------<  120<H>  3.9   |  25  |  1.17    Ca    9.6      15 Aug 2019 01:26  Phos  2.2     08-15  Mg     2.1     08-15    TPro  7.8  /  Alb  4.1  /  TBili  0.4  /  DBili  x   /  AST  41<H>  /  ALT  22  /  AlkPhos  83  08-15      PT/INR - ( 15 Aug 2019 01:26 )   PT: 12.3 sec;   INR: 1.07 ratio         PTT - ( 15 Aug 2019 01:26 )  PTT:>200.0 sec                  CARDIAC MARKERS ( 15 Aug 2019 09:50 )  x     / x     / 84 U/L / x     / 2.3 ng/mL  CARDIAC MARKERS ( 15 Aug 2019 01:26 )  x     / x     / 106 U/L / x     / 2.5 ng/mL    Troponin T, High Sensitivity Result: 15: Rapid upward or downward changes in high-sensitivity troponin levels  suggest acute myocardial injury. Renal impairment may cause sustained  troponin elevations.  Normal: <6 - 14 ng/L  Indeterminate: 15-51 ng/L  Elevated: > 51 ng/L  See http://labs/test/[email or web address] on the [facility] [employer] for more  information ng/L (08.15.19 @ 09:50)    Troponin T, High Sensitivity (08.15.19 @ 01:26)    Troponin T, High Sensitivity Result: 16: Rapid upward or downward changes in high-sensitivity troponin levels  suggest acute myocardial injury. Renal impairment may cause sustained  troponin elevations.  Normal: <6 - 14 ng/L  Indeterminate: 15-51 ng/L  Elevated: > 51 ng/L  See http://labs/test/[email or web address] on the [facility] [employer] for more  information ng/L    Hemoglobin A1C, Whole Blood (08.15.19 @ 02:21)    Hemoglobin A1C, Whole Blood: 6.3: Method: Immunoassay    Lipid Profile (08.15.19 @ 02:21)    Total Cholesterol/HDL Ratio Measurement: 4.5 RATIO    Cholesterol, Serum: 260 mg/dL    Triglycerides, Serum: 182 mg/dL    HDL Cholesterol, Serum: 58: HDL Levels >/= 60 mg/dL are considered beneficial and a "negative" risk  factor.  Effective 08/15/2018: New reference range and interpretive comment. mg/dL    Direct LDL: 166: LDL Cholesterol (mg/dL) --- Interpretive Comment (for adults 18 and over)    Thyroid Stimulating Hormone, Serum (08.15.19 @ 02:21)    Thyroid Stimulating Hormone, Serum: 2.38 uIU/mL    Imaging personally reviewed   < from: TTE with Doppler (w/Cont) (08.15.19 @ 08:32) >  Conclusions:  1. Mitral annular calcification, otherwise normal mitral  valve. Minimal mitral regurgitation.  2. Normal trileaflet aortic valve. Minimal aortic  regurgitation.  3. Hyperdynamic left ventricular systolic function.  Endocardial visualization enhanced with intravenous  injection of Ultrasonic Enhancing Agent (Definity). No left  ventricular thrombus.  4. Normal diastolic function Reversal of the E-A waves of  the mitral inflow pattern consistent with reduced  compliance of the left ventricle.  5. Normal right ventricular size and function.  6. Normal pericardium with no pericardial effusion.  *** No previous Echo exam.    < end of copied text >    EKG personally reviewed [facility]

## 2019-08-15 NOTE — H&P ADULT - NSHPPHYSICALEXAM_GEN_ALL_CORE
General: NAD  Head: Normocephalic, Atraumatic  Eyes: PERRLA, EOMI, normal sclera  Throat: Moist mucous membranes  Respiratory: Left lower lobe crackles, no wheezes or rales  CV: RRR, S1/S2, no murmurs, rubs or gallops  Abdominal: Soft, Distended, NT, +BS  Extremities: No edema, 2+ DP pulses  Neurological: A&Ox4, MAEx4, non-focal  Skin: No rashes  Lines: PIVs

## 2019-08-15 NOTE — H&P ADULT - HISTORY OF PRESENT ILLNESS
74 year old Tamaz[place]-speaking male with PMH of HTN, CVA (2017) with no residual deficits and GERD presents from [facility] for an IWSTEMI. For the past week, the patient has been having slight chest pain which he describes as burning. He went to his doctor who thought the chest pain was GI related, and was prescribed ranitidine and Antacid Plus. These medications did not improve his symptoms. He was also noted to be newly hypertensive, so he was prescribed amlodipine. Yesterday at 6:00 pm, he started to experience 10/10 burning pain that radiated to his abdomen and back. The patient also reports dizziness. The pain was not associated with shortness of breath, diaphoresis, jaw or shoulder pain, nausea or vomiting. At 10:30 pm, he went to [facility] where he was found to be bradycardia (HR 57 bpm) and hypertensive (/81 mm Hg). EKG showed ST elevations in II, III, and AVF. He was loaded with ASA and brilinta, and was given a heparin bolus. He was transferred to [facility] for further management. 74 year old Uzbek-speaking male with PMH of HTN, CVA (2017) with no residual deficits and GERD presents from [facility] for an IWSTEMI. For the past week, the patient has been having slight chest pain which he describes as burning. He went to his doctor who thought the chest pain was GI related, and was prescribed ranitidine and Antacid Plus. These medications did not improve his symptoms. His chest pain worsened with use of these medications. He was also noted to be newly hypertensive, so he was prescribed amlodipine. Yesterday at 6:00 pm, he started to experience 10/10 burning pain that radiated to his abdomen and back. The patient also reports associated dizziness. The pain was not associated with shortness of breath, diaphoresis, jaw pain, shoulder pain, nausea or vomiting. At 10:30 pm, he went to [facility] where he was found to be bradycardic (HR 57 bpm) and hypertensive (/81 mm Hg). EKG showed ST elevations in II, III, and AVF. He was loaded with ASA and brilinta, and was given a heparin bolus. He was transferred to [facility] for further management. 74 year old Macedonian-speaking male with PMH of HTN, CVA (2017) with no residual deficits and GERD presents from [facility] for an IWSTEMI. For the past week, the patient has been having slight chest pain which he describes as burning. He went to his doctor who thought the chest pain was GI related, and was prescribed ranitidine and Antacid Plus. These medications did not improve his symptoms. His chest pain worsened with use of these medications. He was also noted to be newly hypertensive, so he was prescribed amlodipine. Yesterday at 6:00 pm, he started to experience 10/10 burning pain that radiated to his abdomen and back. The patient also reports associated dizziness. The pain was not associated with shortness of breath, diaphoresis, jaw pain, shoulder pain, nausea or vomiting. At 10:30 pm, he went to [facility] where he was found to be bradycardic (HR 57 bpm) and hypertensive (/81 mm Hg). EKG showed ST elevations in II, III, and AVF. He was loaded with ASA and brilinta, and was given a heparin bolus. He was transferred to [facility] for further management. [facility] EF 65%, 90% pLCX s/p DIAZ x1, 90% LAD, 90% Ramus.

## 2019-08-15 NOTE — H&P ADULT - NSHPLABSRESULTS_GEN_ALL_CORE
LABS:                          15.2   8.7   )-----------( 197      ( 15 Aug 2019 01:26 )             46.0     Hb Trend: 15.2<--  WBC Trend: 8.7<--  Plt Trend: 197<--          08-15    135  |  95<L>  |  14  ----------------------------<  120<H>  3.9   |  25  |  1.17    Ca    9.6      15 Aug 2019 01:26  Phos  2.2     08-15  Mg     2.1     08-15    TPro  7.8  /  Alb  4.1  /  TBili  0.4  /  DBili  x   /  AST  41<H>  /  ALT  22  /  AlkPhos  83  08-15    Creatine Kinase, Serum: 106 U/L (08-15-19 @ 01:26)    PT/INR - ( 15 Aug 2019 01:26 )   PT: 12.3 sec;   INR: 1.07 ratio             CAPILLARY BLOOD GLUCOSE              IMAGING:

## 2019-08-15 NOTE — CHART NOTE - NSCHARTNOTEFT_GEN_A_CORE
Removal of Femoral Sheath    Pulses in the right lower extremity are palpable. The patient was placed in the supine position. The insertion site was identified and the sutures were removed per protocol.  The __6__ [place] femoral sheath was then removed. Direct pressure was applied for  __22__ minutes.     Monitoring of the right groin and both lower extremities including neuro-vascular checks and vital signs every 15 minutes x 4, then every 30 minutes x 2, then every 1 hour was ordered.    Complications: None    Comments: Pt is agitated and developed hematoma with sheath in place prior to removal, hematoma pressed out site is soft but ecchymotic. Distal pulses palpable with no cyanosis noted after removal of sheath.

## 2019-08-15 NOTE — CHART NOTE - NSCHARTNOTEFT_GEN_A_CORE
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 The patient described the pain as burning and originally went to his PCP and was prescribed antacids and ranitidine. However the patient's pain did not improve with these medications and in fact worsened. He was also given amlodipine by his PCP because he was found to be newly hypertensive. At 6PM on 8/14 the patient experienced continued worsening of his chest pain which was also radiating to his abdomen and back and was also experiencing dizziness. At this point the patient reported to [facility] where he was found to be bradycardic to 57 BPM and hypertensive to 168/81 mmHg. He also had EKG findings of ST elevations in leads II, III, and AVF. He was loaded with ASA, Brilinta and given a heparin     74 year old Japanese-speaking male with PMH of HTN, CVA (2017) with no residual deficits and GERD presents from [facility] for an IWSTEMI. For the past week, the patient has been having slight chest pain which he describes as burning. He went to his doctor who thought the chest pain was GI related, and was prescribed ranitidine and Antacid Plus. These medications did not improve his symptoms. His chest pain worsened with use of these medications. He was also noted to be newly hypertensive, so he was prescribed amlodipine. Yesterday at 6:00 pm, he started to experience 10/10 burning pain that radiated to his abdomen and back. The patient also reports associated dizziness. The pain was not associated with shortness of breath, diaphoresis, jaw pain, shoulder pain, nausea or vomiting. At 10:30 pm, he went to [facility] where he was found to be bradycardic (HR 57 bpm) and hypertensive (/81 mm Hg). EKG showed ST elevations in II, III, and AVF. He was loaded with ASA and brilinta, and was given a heparin bolus. He was transferred to [facility] for further management. [facility] EF 65%, 90% pLCX s/p DIAZ x1, 90% LAD, 90% Ramus.    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 The patient described the pain as burning and originally went to his PCP and was prescribed antacids and ranitidine. However the patient's pain did not improve with these medications and in fact worsened. He was also given amlodipine by his PCP because he was found to be newly hypertensive. At 6PM on 8/14 the patient experienced continued worsening of his chest pain which was also radiating to his abdomen and back and was also experiencing dizziness. At this point the patient reported to [facility] where he was found to be bradycardic to 57 BPM and hypertensive to 168/81 mmHg. He also had EKG findings of ST elevations in leads II, III, and AVF. He was loaded with ASA, Brilinta and given a heparin bolus. The patient was then transferred to [facility] for further management. He underwent a left heart catheterization on 8/14 showing an EF of 65%, 90% occlusion of the pLCX s/p 1 DIAZ. The patient was also found to have 90% occlusion to the LAD and 90% occlusion of the ramus.    In the CCU, the patient was started on atorvastatin and tamsulosin for urinary retention. The patient had some episodes of agitation, once in the cath lab and again in the CCU. His most recent episode of agitation had him moving excessively and caused him to have some bleeding from his right groin catheterization site. Per family the patient tends to be forgetful at home and his agitation was most likely in the setting of being in an unfamiliar environment. For his agitation the patient received 1 dose of 0.125mg Xanax, 2 doses of 2.5mg haldol and 1 dose of 2mg versed. He has been on one to one observation since then and has been cooperative especially with some redirection and support from his wife who has been present at bedside.     ASSESSMENT & PLAN:   74 year old Swedish-speaking male with PMH of HTN, CVA with no residual neurologic deficits, and GERD presents from [facility] for an IWSTEMI, admitted for ACS protocol and ischemic work up, s/p LHC with 90% pLCX s/p DIAZ x1.     #Neuro  Agitation  - after cath, patient became agitated requiring xanax 0.125 mg x1, 2.5 IV haldol x2, and versed 2mg IV x1.  - possible dementia component  - constant observation  - if agitation continues, consult Psychiatry    #Pulmonary  - patient breathing without difficulty on supplemental oxygen  - saturating %; will wean as tolerated  - CXR at [facility] clear; left lower lobe crackles on examination may be atelectasis    #Cardiovascular  IWSTEMI  - s/p ASA and brilinta load   - [facility] EF 65%, 90% pLCX s/p DIAZ x1, 90% LAD, 90% Ramus  - plan for possible staged PCI of LAD and Ramus on 8/16  - echo ordered  - continue with ASA and brilinta, heparin gtt discontinued after [facility]  - continue with statin  - will begin ACEI and beta blocker as blood pressure and HR permits  - continue to trend cardiac enzymes and EKG    HTN  - holding amlodipine in setting of IWSTEMI    #GI  Constipation?  - abdomen distended on examination  - abdominal distension is new as per patient  - patient cannot remember the last time he had a bowel movement  - bowel regimen as needed    #Renal  Urinary retention  - bladder scan with approximately 1L of fluid  - madrid placed  - pt with some abdominal distension that improved with madrid placement    #Endocrine  Pre-diabetes  - A1C 6.3  - treat with lifestyle modification  - monitor serum glucose    #Heme  - no active issues    #ID  - no active issues    PPX  DVT: SCDs  Diet: DASH    FOR FOLLOW UP:  [ ]   [ ]   [ ] CCU Transfer Note    Transfer from: CCU    Transfer to: (  ) Medicine    (  ) Telemetry    (  ) RCU                               (  ) Palliative    (  ) Stroke Unit    (  ) MICU    (  ) __________________    Accepting physician:      Sign out given to:       HPI / CCU COURSE:    Patient is a 75 y/o Luxembourgish-speaking male with hx of HTN, CVA (2017) 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 The patient described the pain as burning and originally went to his PCP and was prescribed antacids and ranitidine. However the patient's pain did not improve with these medications and in fact worsened. He was also given amlodipine by his PCP because he was found to be newly hypertensive. At 6PM on 8/14 the patient experienced continued worsening of his chest pain which was also radiating to his abdomen and back and was also experiencing dizziness. At this point the patient reported to [facility] where he was found to be bradycardic to 57 BPM and hypertensive to 168/81 mmHg. He also had EKG findings of ST elevations in leads II, III, and AVF. He was loaded with ASA, Brilinta and given a heparin bolus. The patient was then transferred to [facility] for further management. He underwent a left heart catheterization on 8/14 showing an EF of 65%, 90% occlusion of the pLCX s/p 1 DIAZ. The patient was also found to have 90% occlusion to the LAD and 90% occlusion of the ramus.    In the CCU, the patient was started on atorvastatin and tamsulosin for urinary retention. The patient had some episodes of agitation, once in the cath lab and again in the CCU. His most recent episode of agitation had him moving excessively and caused him to have some bleeding from his right groin catheterization site. Per family the patient tends to be forgetful at home and his agitation was most likely in the setting of being in an unfamiliar environment. For his agitation the patient received 1 dose of 0.125mg Xanax, 2 doses of 2.5mg haldol and 1 dose of 2mg versed. He has been on one to one observation since then and has been cooperative especially with some redirection and support from his wife who has been present at bedside.     Patient is currently planned for staged PCI to LAD and ramus on 8/16.     ASSESSMENT & PLAN:   74 year old Luxembourgish-speaking male with PMH of HTN, CVA with no residual neurologic deficits, and GERD presents from [facility] for an IWSTEMI, admitted for ACS protocol and ischemic work up, s/p C with 90% pLCX s/p DIAZ x1.     #Neuro  Agitation  - after cath, patient became agitated requiring xanax 0.125 mg x1, 2.5 IV haldol x2, and versed 2mg IV x1.  - possible dementia component  - constant observation  - if agitation continues, consult Psychiatry    #Pulmonary  - patient breathing without difficulty on supplemental oxygen  - saturating %; will wean as tolerated  - CXR at [facility] clear; left lower lobe crackles on examination may be atelectasis    #Cardiovascular  IWSTEMI  - s/p ASA and brilinta load   - [facility] EF 65%, 90% pLCX s/p DIAZ x1, 90% LAD, 90% Ramus  - plan for possible staged PCI of LAD and Ramus on 8/16  - echo ordered  - continue with ASA and brilinta, heparin gtt discontinued after [facility]  - continue with statin  - will begin ACEI and beta blocker as blood pressure and HR permits  - continue to trend cardiac enzymes and EKG    HTN  - holding amlodipine in setting of IWSTEMI    #GI  Constipation?  - abdomen distended on examination  - abdominal distension is new as per patient  - patient cannot remember the last time he had a bowel movement  - bowel regimen as needed    #Renal  Urinary retention  - bladder scan with approximately 1L of fluid  - madrid placed  - pt with some abdominal distension that improved with madrid placement    #Endocrine  Pre-diabetes  - A1C 6.3  - treat with lifestyle modification  - monitor serum glucose    #Heme  - no active issues    #ID  - no active issues    PPX  DVT: HSQ  Diet: DASH (Halal)    FOR FOLLOW UP:  [ ] Pt planned for staged PCI to LAD and ramus on 8/16, ensure that AC is held prior to procedure  [ ] After Staged PCI, patient will require initiation of beta blocker and ACEi if his BP and HR tolerates it  [ ] Ensure that patient has f/u with outpatient cardiologist  [ ] [place] cath to be removed now, patient to undergo TOV, pt started on tamsulosin to facilitate urination CCU Transfer Note    Transfer from: CCU    Transfer to: (  ) Medicine    ( X ) Telemetry [[address] [place] 620D]   (  ) RCU                               (  ) Palliative    (  ) Stroke Unit    (  ) MICU    (  ) __________________    Accepting physician:    Sign out given to:     HPI / CCU COURSE:  Patient is a 73 y/o Telugu-speaking male with hx of HTN, CVA (2017) 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 The patient described the pain as burning and originally went to his PCP and was prescribed antacids and ranitidine. However the patient's pain did not improve with these medications and in fact worsened. He was also given amlodipine by his PCP because he was found to be newly hypertensive. At 6PM on 8/14 the patient experienced continued worsening of his chest pain which was also radiating to his abdomen and back and was also experiencing dizziness. At this point the patient reported to [facility] where he was found to be bradycardic to 57 BPM and hypertensive to 168/81 mmHg. He also had EKG findings of ST elevations in leads II, III, and AVF. He was loaded with ASA, Brilinta and given a heparin bolus. The patient was then transferred to [facility] for further management. He underwent a left heart catheterization on 8/14 showing an EF of 65%, 90% occlusion of the pLCX s/p 1 DIAZ. The patient was also found to have 90% occlusion to the LAD and 90% occlusion of the ramus.    In the CCU, the patient was started on atorvastatin and tamsulosin for urinary retention. The patient had some episodes of agitation, once in the cath lab and again in the CCU. His most recent episode of agitation had him moving excessively and caused him to have some bleeding from his right groin catheterization site. Per family the patient tends to be forgetful at home and his agitation was most likely in the setting of being in an unfamiliar environment. For his agitation the patient received 1 dose of 0.125mg Xanax, 2 doses of 2.5mg haldol and 1 dose of 2mg versed. He has been on one to one observation since then and has been cooperative especially with some redirection and support from his wife who has been present at bedside.     Patient is currently planned for staged PCI to LAD and ramus on 8/16.     ASSESSMENT & PLAN:   74 year old Telugu-speaking male with PMH of HTN, CVA with no residual neurologic deficits, and GERD presents from [facility] for an IWSTEMI, admitted for ACS protocol and ischemic work up, s/p LHC with 90% pLCX s/p DIAZ x1. ASA, Brilinta loaded, currently planned for staged LAD and ramus on 8/15.    #Neuro  Agitation  - after cath, patient became agitated requiring xanax 0.125 mg x1, 2.5 IV haldol x2, and versed 2mg IV x1.  - possible dementia component  - constant observation  - if agitation continues, consult Psychiatry    #Pulmonary  - patient breathing without difficulty on supplemental oxygen  - saturating %; will wean as tolerated  - CXR at [facility] clear; left lower lobe crackles on examination may be atelectasis    #Cardiovascular  IWSTEMI  - s/p ASA and brilinta load   - [facility] EF 65%, 90% pLCX s/p DIAZ x1, 90% LAD, 90% Ramus  - plan for possible staged PCI of LAD and Ramus on 8/16  - echo ordered  - continue with ASA and brilinta, heparin gtt discontinued after LHC  - continue with statin  - will begin ACEI and beta blocker as blood pressure and HR permits  - continue to trend cardiac enzymes and EKG    HTN  - holding amlodipine in setting of IWSTEMI    #GI  Constipation?  - abdomen distended on examination  - abdominal distension is new as per patient  - patient cannot remember the last time he had a bowel movement  - bowel regimen as needed    #Renal  Urinary retention  - bladder scan with approximately 1L of fluid  - madrid placed  - pt with some abdominal distension that improved with madrid placement    #Endocrine  Pre-diabetes  - A1C 6.3  - treat with lifestyle modification  - monitor serum glucose    #Heme  - no active issues    #ID  - no active issues    PPX  DVT: HSQ  Diet: DASH (Halal)    FOR FOLLOW UP:  [ ] Pt planned for staged PCI to LAD and ramus on 8/16, ensure that AC is held prior to procedure  [ ] After Staged PCI, patient will require initiation of beta blocker and ACEi if his BP and HR tolerates it  [ ] Ensure that patient has f/u with outpatient cardiologist  [ ] Mardid cath to be removed now, patient to undergo TOV, pt started on tamsulosin to facilitate urination CCU Transfer Note    Transfer from: CCU    Transfer to: (  ) Medicine    ( X ) Telemetry [[address] [place] 620D]   (  ) RCU                               (  ) Palliative    (  ) Stroke Unit    (  ) MICU    (  ) __________________    Accepting physician: Dr. Maria D Almonte    Sign out given to: Dr. 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 The patient described the pain as burning and originally went to his PCP and was prescribed antacids and ranitidine. However the patient's pain did not improve with these medications and in fact worsened. He was also given amlodipine by his PCP because he was found to be newly hypertensive. At 6PM on 8/14 the patient experienced continued worsening of his chest pain which was also radiating to his abdomen and back and was also experiencing dizziness. At this point the patient reported to [facility] where he was found to be bradycardic to 57 BPM and hypertensive to 168/81 mmHg. He also had EKG findings of ST elevations in leads II, III, and AVF. He was loaded with ASA, Brilinta and given a heparin bolus. The patient was then transferred to [facility] for further management. He underwent a left heart catheterization on 8/14 showing an EF of 65%, 90% occlusion of the pLCX s/p 1 DIAZ. The patient was also found to have 90% occlusion to the LAD and 90% occlusion of the ramus.    In the CCU, the patient was started on atorvastatin and tamsulosin for urinary retention. The patient had some episodes of agitation, once in the cath lab and again in the CCU. His most recent episode of agitation had him moving excessively and caused him to have some bleeding from his right groin catheterization site. Per family the patient tends to be forgetful at home and his agitation was most likely in the setting of being in an unfamiliar environment. For his agitation the patient received 1 dose of 0.125mg Xanax, 2 doses of 2.5mg haldol and 1 dose of 2mg versed. He has been on one to one observation since then and has been cooperative especially with some redirection and support from his wife who has been present at bedside.     Patient is currently planned for staged PCI to LAD and ramus on 8/16.     ASSESSMENT & PLAN:   74 year old Lao-speaking male with PMH of HTN, CVA with no residual neurologic deficits, and GERD presents from [facility] for an IWSTEMI, admitted for ACS protocol and ischemic work up, s/p LHC with 90% pLCX s/p DIAZ x1. ASA, Brilinta loaded, currently planned for staged LAD and ramus on 8/15.    #Neuro  Agitation  - after cath, patient became agitated requiring xanax 0.125 mg x1, 2.5 IV haldol x2, and versed 2mg IV x1.  - possible dementia component  - constant observation  - if agitation continues, consult Psychiatry    #Pulmonary  - patient breathing without difficulty on supplemental oxygen  - saturating %; will wean as tolerated  - CXR at [facility] clear; left lower lobe crackles on examination may be atelectasis    #Cardiovascular  IWSTEMI  - s/p ASA and brilinta load   - [facility] EF 65%, 90% pLCX s/p DIAZ x1, 90% LAD, 90% Ramus  - plan for possible staged PCI of LAD and Ramus on 8/16  - echo ordered  - continue with ASA and brilinta, heparin gtt discontinued after LHC  - continue with statin  - will begin ACEI and beta blocker as blood pressure and HR permits  - continue to trend cardiac enzymes and EKG    HTN  - holding amlodipine in setting of IWSTEMI    #GI  Constipation?  - abdomen distended on examination  - abdominal distension is new as per patient  - patient cannot remember the last time he had a bowel movement  - bowel regimen as needed    #Renal  Urinary retention  - bladder scan with approximately 1L of fluid  - madrid placed  - pt with some abdominal distension that improved with madrid placement    #Endocrine  Pre-diabetes  - A1C 6.3  - treat with lifestyle modification  - monitor serum glucose    #Heme  - no active issues    #ID  - no active issues    PPX  DVT: HSQ  Diet: DASH (Halal)    FOR FOLLOW UP:  [ ] Pt planned for staged PCI to LAD and ramus on 8/16, ensure that AC is held prior to procedure  [ ] After Staged PCI, patient will require initiation of beta blocker and ACEi if his BP and HR tolerates it  [ ] Ensure that patient has f/u with outpatient cardiologist  [ ] [place] cath to be removed now, patient to undergo TOV, pt started on tamsulosin to facilitate urination

## 2019-08-15 NOTE — CHART NOTE - NSCHARTNOTEFT_GEN_A_CORE
MAR MICU TRANSFER NOTE    75 y/o Lao speaking Male w/ a pmh significant for HTN, CVA with no residual neurologic deficits, and GERD who initially presented to [facility] with burning chest pain found to have ST elevations in leads II, III, and AVF. He was loaded with ASA, Brilinta and given a heparin bolus. The patient was then transferred to [facility] for further management. He underwent a left heart catheterization on 8/14 showing an EF of 65%, 90% occlusion of the pLCX s/p 1 DIAZ. The patient was also found to have 90% occlusion to the LAD and 90% occlusion of the ramus. Pt deemed hemodynamically stable for transfer to medicine floors. Pt planned for staged PCI to LAD and Ramus on 8/16.               Vital Signs Last 24 Hrs  T(C): 36.7 (15 Aug 2019 16:00), Max: 36.7 (15 Aug 2019 16:00)  T(F): 98 (15 Aug 2019 16:00), Max: 98 (15 Aug 2019 16:00)  HR: 82 (15 Aug 2019 18:15) (48 - 100)  BP: 104/60 (15 Aug 2019 18:15) (79/55 - 186/95)  BP(mean): 73 (15 Aug 2019 18:15) (60 - 122)  RR: 22 (15 Aug 2019 18:15) (12 - 37)  SpO2: 98% (15 Aug 2019 18:15) (96% - 100%)  I&O's Summary    14 Aug 2019 07:01  -  15 Aug 2019 07:00  --------------------------------------------------------  IN: 376 mL / OUT: 1435 mL / NET: -1059 mL    15 Aug 2019 07:01  -  15 Aug 2019 18:54  --------------------------------------------------------  IN: 320 mL / OUT: 315 mL / NET: 5 mL      Allergies    No Known Allergies    Intolerances      MEDICATIONS  (STANDING):  aspirin  chewable 81 milliGRAM(s) Oral daily  atorvastatin 80 milliGRAM(s) Oral at bedtime  chlorhexidine 2% Cloths 1 Application(s) Topical at bedtime  heparin  Injectable 5000 Unit(s) SubCutaneous every 8 hours  tamsulosin 0.4 milliGRAM(s) Oral at bedtime  ticagrelor 90 milliGRAM(s) Oral every 12 hours    MEDICATIONS  (PRN):        CARDIAC MARKERS ( 15 Aug 2019 09:50 )  x     / x     / 84 U/L / x     / 2.3 ng/mL  CARDIAC MARKERS ( 15 Aug 2019 01:26 )  x     / x     / 106 U/L / x     / 2.5 ng/mL                            15.2   8.7   )-----------( 197      ( 15 Aug 2019 01:26 )             46.0     08-15    135  |  95<L>  |  14  ----------------------------<  120<H>  3.9   |  25  |  1.17    Ca    9.6      15 Aug 2019 01:26  Phos  2.2     08-15  Mg     2.1     08-15    TPro  7.8  /  Alb  4.1  /  TBili  0.4  /  DBili  x   /  AST  41<H>  /  ALT  22  /  AlkPhos  83  08-15    PT/INR - ( 15 Aug 2019 01:26 )   PT: 12.3 sec;   INR: 1.07 ratio         PTT - ( 15 Aug 2019 01:26 )  PTT:>200.0 sec        ASSESSMENT & PLAN:     ASSESSMENT & PLAN:   74 year old Lao-speaking male with PMH of HTN, CVA with no residual neurologic deficits, and GERD presents from [facility] for an IWSTEMI, admitted for ACS protocol and ischemic work up, s/p C with 90% pLCX s/p DIAZ x1. ASA, Brilinta loaded, currently planned for staged PCI to LAD and Ramus on 8/16      FOR FOLLOW UP:  [ ] Staged PCI to LAD and Ramus on 8/16  [ ] Begin ACE-I and BB post cath if BP tolerates      Akin Sultana MD  Internal Medicine Resident, PGY-3  [id number]

## 2019-08-15 NOTE — PROGRESS NOTE ADULT - ASSESSMENT
74 year old Welsh-speaking male with PMH of HTN, CVA (2017) with no residual deficits and GERD [facility] showing 90% pLCX s/p DIAZ x1, 90% LAD, 90% Ramus, possibly pending staged PCI.

## 2019-08-16 DIAGNOSIS — R45.1 RESTLESSNESS AND AGITATION: ICD-10-CM

## 2019-08-16 DIAGNOSIS — Z71.89 OTHER SPECIFIED COUNSELING: ICD-10-CM

## 2019-08-16 DIAGNOSIS — I63.9 CEREBRAL INFARCTION, UNSPECIFIED: ICD-10-CM

## 2019-08-16 DIAGNOSIS — D64.9 ANEMIA, UNSPECIFIED: ICD-10-CM

## 2019-08-16 DIAGNOSIS — N40.0 BENIGN PROSTATIC HYPERPLASIA WITHOUT LOWER URINARY TRACT SYMPTOMS: ICD-10-CM

## 2019-08-16 DIAGNOSIS — Z29.9 ENCOUNTER FOR PROPHYLACTIC MEASURES, UNSPECIFIED: ICD-10-CM

## 2019-08-16 DIAGNOSIS — N17.9 ACUTE KIDNEY FAILURE, UNSPECIFIED: ICD-10-CM

## 2019-08-16 DIAGNOSIS — K21.9 GASTRO-ESOPHAGEAL REFLUX DISEASE WITHOUT ESOPHAGITIS: ICD-10-CM

## 2019-08-16 LAB
ANION GAP SERPL CALC-SCNC: 14 MMOL/L — SIGNIFICANT CHANGE UP (ref 5–17)
BLD GP AB SCN SERPL QL: NEGATIVE — SIGNIFICANT CHANGE UP
BUN SERPL-MCNC: 24 MG/DL — HIGH (ref 7–23)
CALCIUM SERPL-MCNC: 9.9 MG/DL — SIGNIFICANT CHANGE UP (ref 8.4–10.5)
CHLORIDE SERPL-SCNC: 97 MMOL/L — SIGNIFICANT CHANGE UP (ref 96–108)
CO2 SERPL-SCNC: 24 MMOL/L — SIGNIFICANT CHANGE UP (ref 22–31)
CREAT SERPL-MCNC: 1.52 MG/DL — HIGH (ref 0.5–1.3)
GLUCOSE SERPL-MCNC: 160 MG/DL — HIGH (ref 70–99)
HCT VFR BLD CALC: 33 % — LOW (ref 39–50)
HCT VFR BLD CALC: 34.8 % — LOW (ref 39–50)
HCV AB S/CO SERPL IA: 0.08 S/CO — SIGNIFICANT CHANGE UP (ref 0–0.99)
HCV AB SERPL-IMP: SIGNIFICANT CHANGE UP
HGB BLD-MCNC: 11.5 G/DL — LOW (ref 13–17)
HGB BLD-MCNC: 11.8 G/DL — LOW (ref 13–17)
INR BLD: 1.09 RATIO — SIGNIFICANT CHANGE UP (ref 0.88–1.16)
MAGNESIUM SERPL-MCNC: 2.2 MG/DL — SIGNIFICANT CHANGE UP (ref 1.6–2.6)
MCHC RBC-ENTMCNC: 30.3 PG — SIGNIFICANT CHANGE UP (ref 27–34)
MCHC RBC-ENTMCNC: 32.5 PG — SIGNIFICANT CHANGE UP (ref 27–34)
MCHC RBC-ENTMCNC: 33 GM/DL — SIGNIFICANT CHANGE UP (ref 32–36)
MCHC RBC-ENTMCNC: 35.8 GM/DL — SIGNIFICANT CHANGE UP (ref 32–36)
MCV RBC AUTO: 90.7 FL — SIGNIFICANT CHANGE UP (ref 80–100)
MCV RBC AUTO: 91.8 FL — SIGNIFICANT CHANGE UP (ref 80–100)
PHOSPHATE SERPL-MCNC: 2.8 MG/DL — SIGNIFICANT CHANGE UP (ref 2.5–4.5)
PLATELET # BLD AUTO: 193 K/UL — SIGNIFICANT CHANGE UP (ref 150–400)
PLATELET # BLD AUTO: 201 K/UL — SIGNIFICANT CHANGE UP (ref 150–400)
POTASSIUM SERPL-MCNC: 3.9 MMOL/L — SIGNIFICANT CHANGE UP (ref 3.5–5.3)
POTASSIUM SERPL-SCNC: 3.9 MMOL/L — SIGNIFICANT CHANGE UP (ref 3.5–5.3)
PROTHROM AB SERPL-ACNC: 12.4 SEC — SIGNIFICANT CHANGE UP (ref 10–13.1)
RBC # BLD: 3.64 M/UL — LOW (ref 4.2–5.8)
RBC # BLD: 3.79 M/UL — LOW (ref 4.2–5.8)
RBC # FLD: 11.9 % — SIGNIFICANT CHANGE UP (ref 10.3–14.5)
RBC # FLD: 12.5 % — SIGNIFICANT CHANGE UP (ref 10.3–14.5)
RH IG SCN BLD-IMP: POSITIVE — SIGNIFICANT CHANGE UP
SODIUM SERPL-SCNC: 135 MMOL/L — SIGNIFICANT CHANGE UP (ref 135–145)
WBC # BLD: 11.9 K/UL — HIGH (ref 3.8–10.5)
WBC # BLD: 12.85 K/UL — HIGH (ref 3.8–10.5)
WBC # FLD AUTO: 11.9 K/UL — HIGH (ref 3.8–10.5)
WBC # FLD AUTO: 12.85 K/UL — HIGH (ref 3.8–10.5)

## 2019-08-16 PROCEDURE — 99233 SBSQ HOSP IP/OBS HIGH 50: CPT | Mod: GC

## 2019-08-16 PROCEDURE — 99233 SBSQ HOSP IP/OBS HIGH 50: CPT

## 2019-08-16 RX ORDER — METOPROLOL TARTRATE 50 MG
12.5 TABLET ORAL EVERY 12 HOURS
Refills: 0 | Status: DISCONTINUED | OUTPATIENT
Start: 2019-08-16 | End: 2019-08-16

## 2019-08-16 RX ORDER — FAMOTIDINE 10 MG/ML
20 INJECTION INTRAVENOUS DAILY
Refills: 0 | Status: DISCONTINUED | OUTPATIENT
Start: 2019-08-16 | End: 2019-08-26

## 2019-08-16 RX ORDER — LANOLIN ALCOHOL/MO/W.PET/CERES
3 CREAM (GRAM) TOPICAL AT BEDTIME
Refills: 0 | Status: DISCONTINUED | OUTPATIENT
Start: 2019-08-16 | End: 2019-08-24

## 2019-08-16 RX ORDER — HALOPERIDOL DECANOATE 100 MG/ML
2.5 INJECTION INTRAMUSCULAR ONCE
Refills: 0 | Status: COMPLETED | OUTPATIENT
Start: 2019-08-16 | End: 2019-08-16

## 2019-08-16 RX ORDER — SODIUM CHLORIDE 9 MG/ML
1000 INJECTION INTRAMUSCULAR; INTRAVENOUS; SUBCUTANEOUS
Refills: 0 | Status: DISCONTINUED | OUTPATIENT
Start: 2019-08-16 | End: 2019-08-20

## 2019-08-16 RX ORDER — TICAGRELOR 90 MG/1
1 TABLET ORAL
Qty: 60 | Refills: 0
Start: 2019-08-16 | End: 2019-09-14

## 2019-08-16 RX ORDER — METOPROLOL TARTRATE 50 MG
12.5 TABLET ORAL EVERY 12 HOURS
Refills: 0 | Status: DISCONTINUED | OUTPATIENT
Start: 2019-08-16 | End: 2019-08-21

## 2019-08-16 RX ORDER — HEPARIN SODIUM 5000 [USP'U]/ML
5000 INJECTION INTRAVENOUS; SUBCUTANEOUS EVERY 12 HOURS
Refills: 0 | Status: DISCONTINUED | OUTPATIENT
Start: 2019-08-16 | End: 2019-08-16

## 2019-08-16 RX ORDER — HEPARIN SODIUM 5000 [USP'U]/ML
5000 INJECTION INTRAVENOUS; SUBCUTANEOUS EVERY 12 HOURS
Refills: 0 | Status: DISCONTINUED | OUTPATIENT
Start: 2019-08-16 | End: 2019-08-26

## 2019-08-16 RX ADMIN — TAMSULOSIN HYDROCHLORIDE 0.4 MILLIGRAM(S): 0.4 CAPSULE ORAL at 21:17

## 2019-08-16 RX ADMIN — HEPARIN SODIUM 5000 UNIT(S): 5000 INJECTION INTRAVENOUS; SUBCUTANEOUS at 05:44

## 2019-08-16 RX ADMIN — Medication 12.5 MILLIGRAM(S): at 17:30

## 2019-08-16 RX ADMIN — ATORVASTATIN CALCIUM 80 MILLIGRAM(S): 80 TABLET, FILM COATED ORAL at 21:17

## 2019-08-16 RX ADMIN — TICAGRELOR 90 MILLIGRAM(S): 90 TABLET ORAL at 05:44

## 2019-08-16 RX ADMIN — HEPARIN SODIUM 5000 UNIT(S): 5000 INJECTION INTRAVENOUS; SUBCUTANEOUS at 17:31

## 2019-08-16 RX ADMIN — SODIUM CHLORIDE 75 MILLILITER(S): 9 INJECTION INTRAMUSCULAR; INTRAVENOUS; SUBCUTANEOUS at 14:54

## 2019-08-16 RX ADMIN — HALOPERIDOL DECANOATE 2.5 MILLIGRAM(S): 100 INJECTION INTRAMUSCULAR at 00:41

## 2019-08-16 RX ADMIN — SODIUM CHLORIDE 75 MILLILITER(S): 9 INJECTION INTRAMUSCULAR; INTRAVENOUS; SUBCUTANEOUS at 21:17

## 2019-08-16 RX ADMIN — TICAGRELOR 90 MILLIGRAM(S): 90 TABLET ORAL at 17:31

## 2019-08-16 RX ADMIN — Medication 81 MILLIGRAM(S): at 11:24

## 2019-08-16 RX ADMIN — Medication 3 MILLIGRAM(S): at 21:17

## 2019-08-16 NOTE — PROGRESS NOTE ADULT - ASSESSMENT
74 year old [place]ut-speaking male with PMH of HTN, CVA with no residual neurologic deficits, and GERD presents from [facility] for an IWSTEMI, admitted for ACS protocol and ischemic work up, s/p LHC with 90% pLCX s/p DIAZ x1 for staged PCI delayed till monday due to TERRY

## 2019-08-16 NOTE — PROGRESS NOTE ADULT - SUBJECTIVE AND OBJECTIVE BOX
Patient seen and examined at bedside.    Overnight Events: No acute events overnight.       REVIEW OF SYSTEMS:  Constitutional:     [ ] negative [ ] fevers [ ] chills [ ] weight loss [ ] weight gain  HEENT:                  [ ] negative [ ] dry eyes [ ] eye irritation [ ] postnasal drip [ ] nasal congestion  CV:                         [ ] negative  [ ] chest pain [ ] orthopnea [ ] palpitations [ ] murmur  Resp:                     [ ] negative [ ] cough [ ] shortness of breath [ ] dyspnea [ ] wheezing [ ] sputum [ ]hemoptysis  GI:                          [ ] negative [ ] nausea [ ] vomiting [ ] diarrhea [ ] constipation [ ] abd pain [ ] dysphagia   :                        [ ] negative [ ] dysuria [ ] nocturia [ ] hematuria [ ] increased urinary frequency  Musculoskeletal: [ ] negative [ ] back pain [ ] myalgias [ ] arthralgias [ ] fracture  Skin:                       [ ] negative [ ] rash [ ] itch  Neurological:        [ ] negative [ ] headache [ ] dizziness [ ] syncope [ ] weakness [ ] numbness  Psychiatric:           [ ] negative [ ] anxiety [ ] depression  Endocrine:            [ ] negative [ ] diabetes [ ] thyroid problem  Heme/Lymph:      [ ] negative [ ] anemia [ ] bleeding problem  Allergic/Immune: [ ] negative [ ] itchy eyes [ ] nasal discharge [ ] hives [ ] angioedema    [ x] All other systems negative  [ ] Unable to assess ROS due to    Current Meds:  aspirin  chewable 81 milliGRAM(s) Oral daily  atorvastatin 80 milliGRAM(s) Oral at bedtime  chlorhexidine 2% Cloths 1 Application(s) Topical at bedtime  heparin  Injectable 5000 Unit(s) SubCutaneous every 8 hours  tamsulosin 0.4 milliGRAM(s) Oral at bedtime  ticagrelor 90 milliGRAM(s) Oral every 12 hours      PAST MEDICAL & SURGICAL HISTORY:  Cerebrovascular accident (CVA)  GERD (gastroesophageal reflux disease)  Essential hypertension  No significant past surgical history      Vitals:  T(F): 97.4 (08-16), Max: 99.1 (08-15)  HR: 84 (08-16) (66 - 100)  BP: 102/61 (08-16) (91/59 - 165/76)  RR: 18 (08-16)  SpO2: 97% (08-16)  I&O's Summary    15 Aug 2019 07:01  -  16 Aug 2019 07:00  --------------------------------------------------------  IN: 760 mL / OUT: 515 mL / NET: 245 mL        Physical Exam:  Appearance: No acute distress; well appearing  Eyes: PERRL, EOMI, pink conjunctiva  HENT: Normal oral mucosa  Cardiovascular: RRR, S1, S2, no murmurs, rubs, or gallops; no edema; no JVD  Respiratory: Clear to auscultation bilaterally  Gastrointestinal: soft, non-tender, non-distended with normal bowel sounds  Musculoskeletal: No clubbing; no joint deformity   Neurologic: Non-focal  Lymphatic: No lymphadenopathy  Psychiatry: AAOx3, mood & affect appropriate  Skin: No rashes, ecchymoses, or cyanosis                          11.5   12.85 )-----------( 201      ( 16 Aug 2019 08:39 )             34.8     08-16    135  |  97  |  24<H>  ----------------------------<  160<H>  3.9   |  24  |  1.52<H>    Ca    9.9      16 Aug 2019 06:37  Phos  2.8     08-16  Mg     2.2     08-16    TPro  7.8  /  Alb  4.1  /  TBili  0.4  /  DBili  x   /  AST  41<H>  /  ALT  22  /  AlkPhos  83  08-15    PT/INR - ( 16 Aug 2019 08:03 )   PT: 12.4 sec;   INR: 1.09 ratio         PTT - ( 15 Aug 2019 01:26 )  PTT:>200.0 sec  CARDIAC MARKERS ( 15 Aug 2019 09:50 )  x     / x     / 84 U/L / x     / 2.3 ng/mL  CARDIAC MARKERS ( 15 Aug 2019 01:26 )  x     / x     / 106 U/L / x     / 2.5 ng/mL              New ECG(s): Personally reviewed    Echo:  < from: TTE with Doppler (w/Cont) (08.15.19 @ 08:32) >  1. Mitral annular calcification, otherwise normal mitral  valve. Minimal mitral regurgitation.  2. Normal trileaflet aortic valve. Minimal aortic  regurgitation.  3. Hyperdynamic left ventricular systolic function.  Endocardial visualization enhanced with intravenous  injection of Ultrasonic Enhancing Agent (Definity). No left  ventricular thrombus.  4. Normal diastolic function Reversal of the E-A waves of  the mitral inflow pattern consistent with reduced  compliance of the left ventricle.  5. Normal right ventricular size and function.  6. Normal pericardium with no pericardial effusion.    < end of copied text >        Interpretation of Telemetry: Sinus  Patient seen and examined at bedside.    Overnight Events: No acute events overnight.       REVIEW OF SYSTEMS:  Constitutional:     [ ] negative [ ] fevers [ ] chills [ ] weight loss [ ] weight gain  HEENT:                  [ ] negative [ ] dry eyes [ ] eye irritation [ ] postnasal drip [ ] nasal congestion  CV:                         [ ] negative  [ ] chest pain [ ] orthopnea [ ] palpitations [ ] murmur  Resp:                     [ ] negative [ ] cough [ ] shortness of breath [ ] dyspnea [ ] wheezing [ ] sputum [ ]hemoptysis  GI:                          [ ] negative [ ] nausea [ ] vomiting [ ] diarrhea [ ] constipation [ ] abd pain [ ] dysphagia   :                        [ ] negative [ ] dysuria [ ] nocturia [ ] hematuria [ ] increased urinary frequency  Musculoskeletal: [ ] negative [ ] back pain [ ] myalgias [ ] arthralgias [ ] fracture  Skin:                       [ ] negative [ ] rash [ ] itch  Neurological:        [ ] negative [ ] headache [ ] dizziness [ ] syncope [ ] weakness [ ] numbness  Psychiatric:           [ ] negative [ ] anxiety [ ] depression  Endocrine:            [ ] negative [ ] diabetes [ ] thyroid problem  Heme/Lymph:      [ ] negative [ ] anemia [ ] bleeding problem  Allergic/Immune: [ ] negative [ ] itchy eyes [ ] nasal discharge [ ] hives [ ] angioedema    [ x] All other systems negative  [ ] Unable to assess ROS due to    Current Meds:  aspirin  chewable 81 milliGRAM(s) Oral daily  atorvastatin 80 milliGRAM(s) Oral at bedtime  chlorhexidine 2% Cloths 1 Application(s) Topical at bedtime  heparin  Injectable 5000 Unit(s) SubCutaneous every 8 hours  tamsulosin 0.4 milliGRAM(s) Oral at bedtime  ticagrelor 90 milliGRAM(s) Oral every 12 hours      PAST MEDICAL & SURGICAL HISTORY:  Cerebrovascular accident (CVA)  GERD (gastroesophageal reflux disease)  Essential hypertension  No significant past surgical history      Vitals:  T(F): 97.4 (08-16), Max: 99.1 (08-15)  HR: 84 (08-16) (66 - 100)  BP: 102/61 (08-16) (91/59 - 165/76)  RR: 18 (08-16)  SpO2: 97% (08-16)    Physical Exam:  Appearance: No acute distress; well appearing  Eyes: PERRL, EOMI, pink conjunctiva  HENT: Normal oral mucosa  Cardiovascular: RRR, S1, S2, no murmurs, rubs, or gallops; no edema; no JVD  Respiratory: Clear to auscultation bilaterally  Gastrointestinal: soft, non-tender, non-distended with normal bowel sounds  Musculoskeletal: No clubbing; no joint deformity   Neurologic: Non-focal  Lymphatic: No lymphadenopathy  Psychiatry: AAOx3, mood & affect appropriate  Skin: No rashes, ecchymoses, or cyanosis      I&O's Summary    15 Aug 2019 07:01  -  16 Aug 2019 07:00  --------------------------------------------------------  IN: 760 mL / OUT: 515 mL / NET: 245 mL      LABS:                      11.5   12.85 )-----------( 201      ( 16 Aug 2019 08:39 )             34.8     08-16  135  |  97  |  24<H>  ----------------------------<  160<H>  3.9   |  24  |  1.52<H>    Ca    9.9      16 Aug 2019 06:37  Phos  2.8     08-16  Mg     2.2     08-16    TPro  7.8  /  Alb  4.1  /  TBili  0.4  /  DBili  x   /  AST  41<H>  /  ALT  22  /  AlkPhos  83  08-15    PT/INR - ( 16 Aug 2019 08:03 )   PT: 12.4 sec;   INR: 1.09 ratio    PTT - ( 15 Aug 2019 01:26 )  PTT:>200.0 sec    CARDIAC MARKERS ( 15 Aug 2019 09:50 )  x     / x     / 84 U/L / x     / 2.3 ng/mL  CARDIAC MARKERS ( 15 Aug 2019 01:26 )  x     / x     / 106 U/L / x     / 2.5 ng/mL      TTE with Doppler (w/Cont) (08.15.19 @ 08:32) >  1. Mitral annular calcification, otherwise normal mitral valve. Minimal mitral regurgitation.  2. Normal trileaflet aortic valve. Minimal aortic regurgitation.  3. Hyperdynamic left ventricular systolic function.  Endocardial visualization enhanced with intravenous injection of Ultrasonic Enhancing Agent (Definity). No left ventricular thrombus.   4. Normal diastolic function Reversal of the E-A waves of the mitral inflow pattern consistent with reduced compliance of the left ventricle.   5. Normal right ventricular size and function.  6. Normal pericardium with no pericardial effusion.      Interpretation of Telemetry: Sinus

## 2019-08-16 NOTE — PROGRESS NOTE ADULT - SUBJECTIVE AND OBJECTIVE BOX
Patient is a 74y old  Male who presents with a chief complaint of IWSTEMI (16 Aug 2019 09:39)    Daughter bedside translating per pt request      SUBJECTIVE / OVERNIGHT EVENTS: No overnight events. FEels well, denies cp, sob, palpitations, lightheadedness.     tele reviewed: sinus     MEDICATIONS  (STANDING):  aspirin  chewable 81 milliGRAM(s) Oral daily  atorvastatin 80 milliGRAM(s) Oral at bedtime  chlorhexidine 2% Cloths 1 Application(s) Topical at bedtime  heparin  Injectable 5000 Unit(s) SubCutaneous every 12 hours  tamsulosin 0.4 milliGRAM(s) Oral at bedtime  ticagrelor 90 milliGRAM(s) Oral every 12 hours    MEDICATIONS  (PRN):      Vital Signs Last 24 Hrs  T(C): 36.7 (16 Aug 2019 11:57), Max: 37.3 (15 Aug 2019 20:24)  T(F): 98 (16 Aug 2019 11:57), Max: 99.1 (15 Aug 2019 20:24)  HR: 100 (16 Aug 2019 11:57) (66 - 100)  BP: 122/69 (16 Aug 2019 11:57) (97/51 - 165/76)  BP(mean): 73 (15 Aug 2019 18:15) (62 - 100)  RR: 18 (16 Aug 2019 11:57) (15 - 33)  SpO2: 99% (16 Aug 2019 11:57) (96% - 99%)  CAPILLARY BLOOD GLUCOSE        I&O's Summary    15 Aug 2019 07:01  -  16 Aug 2019 07:00  --------------------------------------------------------  IN: 760 mL / OUT: 515 mL / NET: 245 mL    16 Aug 2019 07:01  -  16 Aug 2019 12:54  --------------------------------------------------------  IN: 290 mL / OUT: 0 mL / NET: 290 mL        PHYSICAL EXAM:  GENERAL: NAD, well-developed  HEAD:  Atraumatic, Normocephalic  NECK: Supple, No JVD  CHEST/LUNG: Clear to auscultation bilaterally; No wheeze  HEART: Regular rate and rhythm;   ABDOMEN: Soft, Nontender, Nondistended; Bowel sounds present  EXTREMITIES:  2+ Peripheral Pulses, No clubbing, cyanosis, or edema. R groin with no swelling, tenderness, bleeding  PSYCH: AAOx3  NEUROLOGY: non-focal      LABS:                        11.5   12.85 )-----------( 201      ( 16 Aug 2019 08:39 )             34.8     08-16    135  |  97  |  24<H>  ----------------------------<  160<H>  3.9   |  24  |  1.52<H>    Ca    9.9      16 Aug 2019 06:37  Phos  2.8     08-16  Mg     2.2     08-16    TPro  7.8  /  Alb  4.1  /  TBili  0.4  /  DBili  x   /  AST  41<H>  /  ALT  22  /  AlkPhos  83  08-15    PT/INR - ( 16 Aug 2019 08:03 )   PT: 12.4 sec;   INR: 1.09 ratio         PTT - ( 15 Aug 2019 01:26 )  PTT:>200.0 sec  CARDIAC MARKERS ( 15 Aug 2019 09:50 )  x     / x     / 84 U/L / x     / 2.3 ng/mL  CARDIAC MARKERS ( 15 Aug 2019 01:26 )  x     / x     / 106 U/L / x     / 2.5 ng/mL              RADIOLOGY & ADDITIONAL TESTS:    Imaging Personally Reviewed: TTE: Conclusions:  1. Mitral annular calcification, otherwise normal mitral  valve. Minimal mitral regurgitation.  2. Normal trileaflet aortic valve. Minimal aortic  regurgitation.  3. Hyperdynamic left ventricular systolic function.  Endocardial visualization enhanced with intravenous  injection of Ultrasonic Enhancing Agent (Definity). No left  ventricular thrombus.  4. Normal diastolic function Reversal of the E-A waves of  the mitral inflow pattern consistent with reduced  compliance of the left ventricle.  5. Normal right ventricular size and function.  6. Normal pericardium with no pericardial effusion.  *** No previous Echo exam.    Consultant(s) Notes Reviewed:  all    Care Discussed with Consultants/Other Providers:

## 2019-08-16 NOTE — PROGRESS NOTE ADULT - ASSESSMENT
74 year old English-speaking male with PMH of HTN, CVA with no residual neurologic deficits, and GERD presents from [facility] for concern for IWSTEMI, admitted for ACS protocol and ischemic work up, s/p LHC with 90% pLCX s/p DIAZ x1. Cardiac enzymes negative.     #1. Chest pain likely 2/2 CAD w/ ECG changes but w/o IWSTEMI  - Pt had ECG with ISELA but without reciprocal changes.   - However, on cath he does have significant CAD.   - Echo with hyperdynamic LV.   - S/p PCI to pLCX w/ plan for staged for LAD/ RAMUS   - Pts Cr up trending today. Would give IVF today and trend Cr over the weekend.   - Will plan for staged intervention on Monday.   - C/w ASA and Brilinta.  - If pts insurance does not cover Brilinta he should be switched to Plavix If this is needed he will need 600 mg of Plavix 12 hours after last Brilinta dose, following by 75 mg daily  - Start metoprolol 12.5 mg BID    Amadou Xie MD  Cardiology Fellow - PGY 5  Text or Call: [phone]  For all New Consults and Questions:  [email or web address]   Login: ba 74 year old Italian-speaking male with PMH of HTN, CVA with no residual neurologic deficits, and GERD.  Presents from [facility]. for concern for IWSTEMI.  Admitted for ACS protocol and ischemic work up, now s/p LHC with 90% pLCX s/p DIAZ x1. Cardiac enzymes negative.       REC:  #1. Chest pain likely 2/2 CAD w/ ECG changes but w/o IWSTEMI  - Pt had ECG with ISELA but without reciprocal changes.   - However, on cath he does have significant CAD.   - Echo with hyperdynamic LV.   - S/P PCI to pLCX.  Plan for staged for LAD/ RAMUS   - Pts Cr up trending today. Would give IVF today and trend Cr over the weekend.   - Will plan for staged intervention on Monday.   - C/w ASA and Brilinta.  - If pts insurance does not cover Brilinta, he should be switched to Plavix.  If this is necessary, he will need 600 mg of Plavix 12 hours after last Brilinta dose, following by 75 mg of Plavix daily.  - Start metoprolol 12.5 mg BID    Amadou Xie MD  Cardiology Fellow - PGY 5  Text or Call: [phone]  For all New Consults and Questions:  [email or web address]   Login: ba Nazario M.D.  Cardiology Attending, Consult Service  [phone]

## 2019-08-17 LAB
ANION GAP SERPL CALC-SCNC: 14 MMOL/L — SIGNIFICANT CHANGE UP (ref 5–17)
BUN SERPL-MCNC: 20 MG/DL — SIGNIFICANT CHANGE UP (ref 7–23)
CALCIUM SERPL-MCNC: 8.9 MG/DL — SIGNIFICANT CHANGE UP (ref 8.4–10.5)
CHLORIDE SERPL-SCNC: 102 MMOL/L — SIGNIFICANT CHANGE UP (ref 96–108)
CO2 SERPL-SCNC: 23 MMOL/L — SIGNIFICANT CHANGE UP (ref 22–31)
CREAT SERPL-MCNC: 1.29 MG/DL — SIGNIFICANT CHANGE UP (ref 0.5–1.3)
GLUCOSE SERPL-MCNC: 130 MG/DL — HIGH (ref 70–99)
HCT VFR BLD CALC: 30.8 % — LOW (ref 39–50)
HCT VFR BLD CALC: 32.9 % — LOW (ref 39–50)
HGB BLD-MCNC: 10.2 G/DL — LOW (ref 13–17)
HGB BLD-MCNC: 11.2 G/DL — LOW (ref 13–17)
MCHC RBC-ENTMCNC: 30.5 PG — SIGNIFICANT CHANGE UP (ref 27–34)
MCHC RBC-ENTMCNC: 31.6 PG — SIGNIFICANT CHANGE UP (ref 27–34)
MCHC RBC-ENTMCNC: 33.1 GM/DL — SIGNIFICANT CHANGE UP (ref 32–36)
MCHC RBC-ENTMCNC: 33.9 GM/DL — SIGNIFICANT CHANGE UP (ref 32–36)
MCV RBC AUTO: 92.2 FL — SIGNIFICANT CHANGE UP (ref 80–100)
MCV RBC AUTO: 93.1 FL — SIGNIFICANT CHANGE UP (ref 80–100)
PLATELET # BLD AUTO: 174 K/UL — SIGNIFICANT CHANGE UP (ref 150–400)
PLATELET # BLD AUTO: 187 K/UL — SIGNIFICANT CHANGE UP (ref 150–400)
POTASSIUM SERPL-MCNC: 3.6 MMOL/L — SIGNIFICANT CHANGE UP (ref 3.5–5.3)
POTASSIUM SERPL-SCNC: 3.6 MMOL/L — SIGNIFICANT CHANGE UP (ref 3.5–5.3)
RBC # BLD: 3.34 M/UL — LOW (ref 4.2–5.8)
RBC # BLD: 3.54 M/UL — LOW (ref 4.2–5.8)
RBC # FLD: 11.8 % — SIGNIFICANT CHANGE UP (ref 10.3–14.5)
RBC # FLD: 12.5 % — SIGNIFICANT CHANGE UP (ref 10.3–14.5)
SODIUM SERPL-SCNC: 139 MMOL/L — SIGNIFICANT CHANGE UP (ref 135–145)
WBC # BLD: 8.43 K/UL — SIGNIFICANT CHANGE UP (ref 3.8–10.5)
WBC # BLD: 8.7 K/UL — SIGNIFICANT CHANGE UP (ref 3.8–10.5)
WBC # FLD AUTO: 8.43 K/UL — SIGNIFICANT CHANGE UP (ref 3.8–10.5)
WBC # FLD AUTO: 8.7 K/UL — SIGNIFICANT CHANGE UP (ref 3.8–10.5)

## 2019-08-17 PROCEDURE — 93010 ELECTROCARDIOGRAM REPORT: CPT

## 2019-08-17 PROCEDURE — 99232 SBSQ HOSP IP/OBS MODERATE 35: CPT

## 2019-08-17 RX ORDER — HALOPERIDOL DECANOATE 100 MG/ML
2.5 INJECTION INTRAMUSCULAR ONCE
Refills: 0 | Status: COMPLETED | OUTPATIENT
Start: 2019-08-17 | End: 2019-08-17

## 2019-08-17 RX ADMIN — Medication 3 MILLIGRAM(S): at 22:04

## 2019-08-17 RX ADMIN — TICAGRELOR 90 MILLIGRAM(S): 90 TABLET ORAL at 16:24

## 2019-08-17 RX ADMIN — Medication 81 MILLIGRAM(S): at 09:55

## 2019-08-17 RX ADMIN — Medication 12.5 MILLIGRAM(S): at 16:24

## 2019-08-17 RX ADMIN — ATORVASTATIN CALCIUM 80 MILLIGRAM(S): 80 TABLET, FILM COATED ORAL at 22:04

## 2019-08-17 RX ADMIN — HALOPERIDOL DECANOATE 2.5 MILLIGRAM(S): 100 INJECTION INTRAMUSCULAR at 08:00

## 2019-08-17 RX ADMIN — Medication 12.5 MILLIGRAM(S): at 05:54

## 2019-08-17 RX ADMIN — HEPARIN SODIUM 5000 UNIT(S): 5000 INJECTION INTRAVENOUS; SUBCUTANEOUS at 05:54

## 2019-08-17 RX ADMIN — HALOPERIDOL DECANOATE 2.5 MILLIGRAM(S): 100 INJECTION INTRAMUSCULAR at 00:00

## 2019-08-17 RX ADMIN — TICAGRELOR 90 MILLIGRAM(S): 90 TABLET ORAL at 05:54

## 2019-08-17 RX ADMIN — HEPARIN SODIUM 5000 UNIT(S): 5000 INJECTION INTRAVENOUS; SUBCUTANEOUS at 16:24

## 2019-08-17 RX ADMIN — TAMSULOSIN HYDROCHLORIDE 0.4 MILLIGRAM(S): 0.4 CAPSULE ORAL at 22:04

## 2019-08-17 NOTE — PROGRESS NOTE ADULT - SUBJECTIVE AND OBJECTIVE BOX
Esperanza Key MD  Attending Physician (Hospitalist)  pager: [phone]    After 5PM please contact Hospitalist Pager [phone]    Patient is a 74y old  Male who presents with a chief complaint of IWSTEMI (16 Aug 2019 12:54)        SUBJECTIVE / OVERNIGHT EVENTS:  Patient agitated and jumping on his bed. Confused overnight. Given 2.5 Haldol. His duaghter is at bedside who acted as an : Told me her dad had been having intermittent faint chest pressure- though none at the time of my exam.   He did no sleep all night and is confused (which is his baseline).   The patient denies any palpitations, SOB, cough, LH, Dizziness, Diaphoresis, NVD.     MEDICATIONS  (STANDING):  aspirin  chewable 81 milliGRAM(s) Oral daily  atorvastatin 80 milliGRAM(s) Oral at bedtime  chlorhexidine 2% Cloths 1 Application(s) Topical at bedtime  heparin  Injectable 5000 Unit(s) SubCutaneous every 12 hours  metoprolol tartrate 12.5 milliGRAM(s) Oral every 12 hours  sodium chloride 0.9%. 1000 milliLiter(s) (75 mL/Hr) IV Continuous <Continuous>  tamsulosin 0.4 milliGRAM(s) Oral at bedtime  ticagrelor 90 milliGRAM(s) Oral every 12 hours    MEDICATIONS  (PRN):  famotidine    Tablet 20 milliGRAM(s) Oral daily PRN reflex, indigestion  melatonin 3 milliGRAM(s) Oral at bedtime PRN Insomnia      Vital Signs Last 24 Hrs  T(C): 36.7 (17 Aug 2019 11:39), Max: 37.2 (16 Aug 2019 17:26)  T(F): 98.1 (17 Aug 2019 11:39), Max: 98.9 (16 Aug 2019 17:26)  HR: 83 (17 Aug 2019 16:22) (67 - 90)  BP: 170/79 (17 Aug 2019 16:22) (117/71 - 170/79)  BP(mean): --  RR: 18 (17 Aug 2019 11:39) (18 - 18)  SpO2: 99% (17 Aug 2019 11:39) (98% - 99%)  CAPILLARY BLOOD GLUCOSE        I&O's Summary    16 Aug 2019 07:01  -  17 Aug 2019 07:00  --------------------------------------------------------  IN: 1075 mL / OUT: 1121 mL / NET: -46 mL    17 Aug 2019 07:01  -  17 Aug 2019 16:43  --------------------------------------------------------  IN: 460 mL / OUT: 800 mL / NET: -340 mL          PHYSICAL EXAM  GENERAL: NAD, well-developed  HEAD:  Atraumatic, Normocephalic  EYES: EOMI, PERRLA, conjunctiva and sclera clear  NECK: Supple, No JVD  CHEST/LUNG: Clear to auscultation bilaterally; No wheeze  HEART: Regular rate and rhythm; No murmurs, rubs, or gallops  ABDOMEN: Soft, Nontender, Nondistended; Bowel sounds present  EXTREMITIES:  2+ Peripheral Pulses, No clubbing, cyanosis, or edema  PSYCH: AAOx1-2, calm on my exam  SKIN: No rashes or lesions    LABS:                        10.2   8.43  )-----------( 174      ( 17 Aug 2019 10:30 )             30.8     08-17    139  |  102  |  20  ----------------------------<  130<H>  3.6   |  23  |  1.29    Ca    8.9      17 Aug 2019 07:11  Phos  2.8     08-16  Mg     2.2     08-16      PT/INR - ( 16 Aug 2019 08:03 )   PT: 12.4 sec;   INR: 1.09 ratio                     RADIOLOGY & ADDITIONAL TESTS:    Imaging Personally Reviewed: ECG this morning: NSR, no acute ST changes  Consultant(s) Notes Reviewed:  Cardiology  Care Discussed with Consultants/Other Providers: MEdicine NP

## 2019-08-17 NOTE — PROGRESS NOTE ADULT - ASSESSMENT
74 year old Italian-speaking male with PMH of HTN, CVA with no residual neurologic deficits, and GERD presents from [facility] for an IWSTEMI, admitted for ACS protocol and ischemic work up, s/p LHC with 90% pLCX s/p DIAZ x1 for staged PCI Monday due to TERRY

## 2019-08-18 LAB
ANION GAP SERPL CALC-SCNC: 15 MMOL/L — SIGNIFICANT CHANGE UP (ref 5–17)
BUN SERPL-MCNC: 15 MG/DL — SIGNIFICANT CHANGE UP (ref 7–23)
CALCIUM SERPL-MCNC: 9.4 MG/DL — SIGNIFICANT CHANGE UP (ref 8.4–10.5)
CHLORIDE SERPL-SCNC: 99 MMOL/L — SIGNIFICANT CHANGE UP (ref 96–108)
CO2 SERPL-SCNC: 23 MMOL/L — SIGNIFICANT CHANGE UP (ref 22–31)
CREAT SERPL-MCNC: 1.11 MG/DL — SIGNIFICANT CHANGE UP (ref 0.5–1.3)
GLUCOSE SERPL-MCNC: 148 MG/DL — HIGH (ref 70–99)
HCT VFR BLD CALC: 33.2 % — LOW (ref 39–50)
HGB BLD-MCNC: 11.7 G/DL — LOW (ref 13–17)
MCHC RBC-ENTMCNC: 32.6 PG — SIGNIFICANT CHANGE UP (ref 27–34)
MCHC RBC-ENTMCNC: 35.3 GM/DL — SIGNIFICANT CHANGE UP (ref 32–36)
MCV RBC AUTO: 92.4 FL — SIGNIFICANT CHANGE UP (ref 80–100)
PLATELET # BLD AUTO: 192 K/UL — SIGNIFICANT CHANGE UP (ref 150–400)
POTASSIUM SERPL-MCNC: 3.4 MMOL/L — LOW (ref 3.5–5.3)
POTASSIUM SERPL-SCNC: 3.4 MMOL/L — LOW (ref 3.5–5.3)
RBC # BLD: 3.59 M/UL — LOW (ref 4.2–5.8)
RBC # FLD: 11.7 % — SIGNIFICANT CHANGE UP (ref 10.3–14.5)
SODIUM SERPL-SCNC: 137 MMOL/L — SIGNIFICANT CHANGE UP (ref 135–145)
WBC # BLD: 9.2 K/UL — SIGNIFICANT CHANGE UP (ref 3.8–10.5)
WBC # FLD AUTO: 9.2 K/UL — SIGNIFICANT CHANGE UP (ref 3.8–10.5)

## 2019-08-18 PROCEDURE — 99232 SBSQ HOSP IP/OBS MODERATE 35: CPT

## 2019-08-18 RX ORDER — QUETIAPINE FUMARATE 200 MG/1
25 TABLET, FILM COATED ORAL AT BEDTIME
Refills: 0 | Status: DISCONTINUED | OUTPATIENT
Start: 2019-08-18 | End: 2019-08-26

## 2019-08-18 RX ORDER — HALOPERIDOL DECANOATE 100 MG/ML
2.5 INJECTION INTRAMUSCULAR ONCE
Refills: 0 | Status: COMPLETED | OUTPATIENT
Start: 2019-08-18 | End: 2019-08-18

## 2019-08-18 RX ORDER — POTASSIUM CHLORIDE 20 MEQ
20 PACKET (EA) ORAL ONCE
Refills: 0 | Status: COMPLETED | OUTPATIENT
Start: 2019-08-18 | End: 2019-08-18

## 2019-08-18 RX ADMIN — Medication 3 MILLIGRAM(S): at 21:16

## 2019-08-18 RX ADMIN — HEPARIN SODIUM 5000 UNIT(S): 5000 INJECTION INTRAVENOUS; SUBCUTANEOUS at 17:31

## 2019-08-18 RX ADMIN — ATORVASTATIN CALCIUM 80 MILLIGRAM(S): 80 TABLET, FILM COATED ORAL at 21:16

## 2019-08-18 RX ADMIN — QUETIAPINE FUMARATE 25 MILLIGRAM(S): 200 TABLET, FILM COATED ORAL at 21:16

## 2019-08-18 RX ADMIN — Medication 20 MILLIEQUIVALENT(S): at 10:10

## 2019-08-18 RX ADMIN — HEPARIN SODIUM 5000 UNIT(S): 5000 INJECTION INTRAVENOUS; SUBCUTANEOUS at 06:01

## 2019-08-18 RX ADMIN — Medication 12.5 MILLIGRAM(S): at 06:01

## 2019-08-18 RX ADMIN — Medication 12.5 MILLIGRAM(S): at 17:31

## 2019-08-18 RX ADMIN — CHLORHEXIDINE GLUCONATE 1 APPLICATION(S): 213 SOLUTION TOPICAL at 10:10

## 2019-08-18 RX ADMIN — Medication 81 MILLIGRAM(S): at 11:39

## 2019-08-18 RX ADMIN — TAMSULOSIN HYDROCHLORIDE 0.4 MILLIGRAM(S): 0.4 CAPSULE ORAL at 21:16

## 2019-08-18 RX ADMIN — TICAGRELOR 90 MILLIGRAM(S): 90 TABLET ORAL at 17:31

## 2019-08-18 RX ADMIN — HALOPERIDOL DECANOATE 2.5 MILLIGRAM(S): 100 INJECTION INTRAMUSCULAR at 03:29

## 2019-08-18 RX ADMIN — TICAGRELOR 90 MILLIGRAM(S): 90 TABLET ORAL at 06:01

## 2019-08-18 NOTE — PROGRESS NOTE ADULT - ASSESSMENT
74 year old Latvian-speaking male with PMH of HTN, CVA with no residual neurologic deficits, and GERD presents from [facility] for an IWSTEMI, admitted for ACS protocol and ischemic work up, s/p LHC with 90% pLCX s/p DIAZ x1 for staged PCI Monday due to TERRY

## 2019-08-18 NOTE — PROGRESS NOTE ADULT - SUBJECTIVE AND OBJECTIVE BOX
Esperanza Key MD  Attending Physician (Hospitalist)  pager: [phone]    After 5PM please contact Hospitalist Pager [phone]    Patient is a 74y old  Male who presents with a chief complaint of IWSTEMI (17 Aug 2019 16:43)        SUBJECTIVE / OVERNIGHT EVENTS:  Agitated overnight. Unfortunately did not get seroquel.   No recurrence of CP, SOB, cough, F/V, N/V, palp.    MEDICATIONS  (STANDING):  aspirin  chewable 81 milliGRAM(s) Oral daily  atorvastatin 80 milliGRAM(s) Oral at bedtime  chlorhexidine 2% Cloths 1 Application(s) Topical at bedtime  heparin  Injectable 5000 Unit(s) SubCutaneous every 12 hours  metoprolol tartrate 12.5 milliGRAM(s) Oral every 12 hours  QUEtiapine 25 milliGRAM(s) Oral at bedtime  sodium chloride 0.9%. 1000 milliLiter(s) (75 mL/Hr) IV Continuous <Continuous>  tamsulosin 0.4 milliGRAM(s) Oral at bedtime  ticagrelor 90 milliGRAM(s) Oral every 12 hours    MEDICATIONS  (PRN):  famotidine    Tablet 20 milliGRAM(s) Oral daily PRN reflex, indigestion  melatonin 3 milliGRAM(s) Oral at bedtime PRN Insomnia      Vital Signs Last 24 Hrs  T(C): 36.3 (18 Aug 2019 11:15), Max: 36.9 (17 Aug 2019 20:04)  T(F): 97.4 (18 Aug 2019 11:15), Max: 98.4 (17 Aug 2019 20:04)  HR: 75 (18 Aug 2019 11:15) (75 - 91)  BP: 176/82 (18 Aug 2019 11:15) (122/72 - 176/82)  BP(mean): --  RR: 18 (18 Aug 2019 11:15) (18 - 18)  SpO2: 98% (18 Aug 2019 11:15) (97% - 98%)  CAPILLARY BLOOD GLUCOSE        I&O's Summary    17 Aug 2019 07:01  -  18 Aug 2019 07:00  --------------------------------------------------------  IN: 940 mL / OUT: 1100 mL / NET: -160 mL    18 Aug 2019 07:01  -  18 Aug 2019 12:51  --------------------------------------------------------  IN: 240 mL / OUT: 450 mL / NET: -210 mL          PHYSICAL EXAM  GENERAL: NAD- resting at time of my exam. Thin elderly male  HEAD:  Atraumatic, Normocephalic  EYES: EOMI, PERRLA, conjunctiva and sclera clear  NECK: Supple, No JVD  CHEST/LUNG: Clear to auscultation bilaterally; No wheeze  HEART: Regular rate and rhythm; No murmurs, rubs, or gallops  ABDOMEN: Soft, Nontender, Nondistended; Bowel sounds present  EXTREMITIES:  2+ Peripheral Pulses, No clubbing, cyanosis, or edema  PSYCH: AAOx1  SKIN: No rashes or lesions    LABS:                        11.2   8.7   )-----------( 187      ( 17 Aug 2019 17:51 )             32.9     08-18    137  |  99  |  15  ----------------------------<  148<H>  3.4<L>   |  23  |  1.11    Ca    9.4      18 Aug 2019 06:08                  RADIOLOGY & ADDITIONAL TESTS:    Imaging Personally Reviewed: Mary Arciniega reviewed:   Consultant(s) Notes Reviewed:  Cardiology  Care Discussed with Consultants/Other Providers: Medicine NP

## 2019-08-19 LAB
ANION GAP SERPL CALC-SCNC: 13 MMOL/L — SIGNIFICANT CHANGE UP (ref 5–17)
BUN SERPL-MCNC: 18 MG/DL — SIGNIFICANT CHANGE UP (ref 7–23)
CALCIUM SERPL-MCNC: 9.8 MG/DL — SIGNIFICANT CHANGE UP (ref 8.4–10.5)
CHLORIDE SERPL-SCNC: 98 MMOL/L — SIGNIFICANT CHANGE UP (ref 96–108)
CO2 SERPL-SCNC: 22 MMOL/L — SIGNIFICANT CHANGE UP (ref 22–31)
CREAT SERPL-MCNC: 1.29 MG/DL — SIGNIFICANT CHANGE UP (ref 0.5–1.3)
GLUCOSE SERPL-MCNC: 129 MG/DL — HIGH (ref 70–99)
MAGNESIUM SERPL-MCNC: 1.9 MG/DL — SIGNIFICANT CHANGE UP (ref 1.6–2.6)
POTASSIUM SERPL-MCNC: 4.2 MMOL/L — SIGNIFICANT CHANGE UP (ref 3.5–5.3)
POTASSIUM SERPL-SCNC: 4.2 MMOL/L — SIGNIFICANT CHANGE UP (ref 3.5–5.3)
SODIUM SERPL-SCNC: 133 MMOL/L — LOW (ref 135–145)

## 2019-08-19 PROCEDURE — 92928 PRQ TCAT PLMT NTRAC ST 1 LES: CPT | Mod: LD,GC

## 2019-08-19 PROCEDURE — 92929: CPT | Mod: LD,GC

## 2019-08-19 PROCEDURE — 99232 SBSQ HOSP IP/OBS MODERATE 35: CPT

## 2019-08-19 PROCEDURE — 93010 ELECTROCARDIOGRAM REPORT: CPT

## 2019-08-19 PROCEDURE — 99152 MOD SED SAME PHYS/QHP 5/>YRS: CPT | Mod: GC

## 2019-08-19 RX ORDER — SODIUM CHLORIDE 9 MG/ML
1000 INJECTION INTRAMUSCULAR; INTRAVENOUS; SUBCUTANEOUS
Refills: 0 | Status: DISCONTINUED | OUTPATIENT
Start: 2019-08-19 | End: 2019-08-20

## 2019-08-19 RX ORDER — MAGNESIUM SULFATE 500 MG/ML
1 VIAL (ML) INJECTION ONCE
Refills: 0 | Status: COMPLETED | OUTPATIENT
Start: 2019-08-19 | End: 2019-08-19

## 2019-08-19 RX ADMIN — Medication 12.5 MILLIGRAM(S): at 06:21

## 2019-08-19 RX ADMIN — TAMSULOSIN HYDROCHLORIDE 0.4 MILLIGRAM(S): 0.4 CAPSULE ORAL at 21:02

## 2019-08-19 RX ADMIN — Medication 12.5 MILLIGRAM(S): at 17:07

## 2019-08-19 RX ADMIN — TICAGRELOR 90 MILLIGRAM(S): 90 TABLET ORAL at 17:07

## 2019-08-19 RX ADMIN — TICAGRELOR 90 MILLIGRAM(S): 90 TABLET ORAL at 06:21

## 2019-08-19 RX ADMIN — Medication 81 MILLIGRAM(S): at 09:15

## 2019-08-19 RX ADMIN — CHLORHEXIDINE GLUCONATE 1 APPLICATION(S): 213 SOLUTION TOPICAL at 09:10

## 2019-08-19 RX ADMIN — ATORVASTATIN CALCIUM 80 MILLIGRAM(S): 80 TABLET, FILM COATED ORAL at 21:02

## 2019-08-19 RX ADMIN — Medication 100 GRAM(S): at 14:27

## 2019-08-19 RX ADMIN — SODIUM CHLORIDE 75 MILLILITER(S): 9 INJECTION INTRAMUSCULAR; INTRAVENOUS; SUBCUTANEOUS at 14:28

## 2019-08-19 RX ADMIN — Medication 3 MILLIGRAM(S): at 21:02

## 2019-08-19 RX ADMIN — QUETIAPINE FUMARATE 25 MILLIGRAM(S): 200 TABLET, FILM COATED ORAL at 21:02

## 2019-08-19 NOTE — PROGRESS NOTE ADULT - SUBJECTIVE AND OBJECTIVE BOX
Esperanza Key MD  Attending Physician (Hospitalist)  pager: [phone]    After 5PM please contact Hospitalist Pager [phone]    Patient is a 74y old  Male who presents with a chief complaint of IWSTEMI (17 Aug 2019 16:43)        SUBJECTIVE / OVERNIGHT EVENTS:  Resting comfortably overnight .  No recurrence of CP, SOB, cough, palp. S/P procedure at the the time my exam.    MEDICATIONS  (STANDING):  aspirin  chewable 81 milliGRAM(s) Oral daily  atorvastatin 80 milliGRAM(s) Oral at bedtime  chlorhexidine 2% Cloths 1 Application(s) Topical at bedtime  heparin  Injectable 5000 Unit(s) SubCutaneous every 12 hours  metoprolol tartrate 12.5 milliGRAM(s) Oral every 12 hours  QUEtiapine 25 milliGRAM(s) Oral at bedtime  sodium chloride 0.9%. 1000 milliLiter(s) (75 mL/Hr) IV Continuous <Continuous>  tamsulosin 0.4 milliGRAM(s) Oral at bedtime  ticagrelor 90 milliGRAM(s) Oral every 12 hours    MEDICATIONS  (PRN):  famotidine    Tablet 20 milliGRAM(s) Oral daily PRN reflex, indigestion  melatonin 3 milliGRAM(s) Oral at bedtime PRN Insomnia      Vital Signs Last 24 Hrs  T(C): 36.3 (18 Aug 2019 11:15), Max: 36.9 (17 Aug 2019 20:04)  T(F): 97.4 (18 Aug 2019 11:15), Max: 98.4 (17 Aug 2019 20:04)  HR: 75 (18 Aug 2019 11:15) (75 - 91)  BP: 176/82 (18 Aug 2019 11:15) (122/72 - 176/82)  BP(mean): --  RR: 18 (18 Aug 2019 11:15) (18 - 18)  SpO2: 98% (18 Aug 2019 11:15) (97% - 98%)  CAPILLARY BLOOD GLUCOSE        I&O's Summary    17 Aug 2019 07:01  -  18 Aug 2019 07:00  --------------------------------------------------------  IN: 940 mL / OUT: 1100 mL / NET: -160 mL    18 Aug 2019 07:01  -  18 Aug 2019 12:51  --------------------------------------------------------  IN: 240 mL / OUT: 450 mL / NET: -210 mL          PHYSICAL EXAM  GENERAL: NAD- resting at time of my exam. Thin elderly male  HEAD:  Atraumatic, Normocephalic  EYES: EOMI, PERRLA, conjunctiva and sclera clear  NECK: Supple, No JVD  CHEST/LUNG: Clear to auscultation bilaterally; No wheeze  HEART: Regular rate and rhythm; No murmurs, rubs, or gallops  ABDOMEN: Soft, Nontender, Nondistended; Bowel sounds present  EXTREMITIES:  2+ Peripheral Pulses, No clubbing, cyanosis, or edema  PSYCH: AAOx1  SKIN: No rashes or lesions    LABS:                        11.2   8.7   )-----------( 187      ( 17 Aug 2019 17:51 )             32.9     08-18    137  |  99  |  15  ----------------------------<  148<H>  3.4<L>   |  23  |  1.11    Ca    9.4      18 Aug 2019 06:08                  RADIOLOGY & ADDITIONAL TESTS:    Imaging Personally Reviewed: Mary Arciniega reviewed:   Consultant(s) Notes Reviewed:  Cardiology  Care Discussed with Consultants/Other Providers: Medicine NP

## 2019-08-19 NOTE — CHART NOTE - NSCHARTNOTEFT_GEN_A_CORE
Removal of Femoral Sheath    Pulses in the left lower extremity is palpable. The patient was placed in the supine position. The insertion site was identified and the sutures were removed per protocol.  The __6__ Yi femoral sheath was then removed. Direct pressure was applied for  __20____ minutes.     Monitoring of the (right/left) groin and both lower extremities including neuro-vascular checks and vital signs every 15 minutes x 4, then every 30 minutes x 2, then every 1 hour was ordered.    Complications: None    Comments:  Explained procedure to family and explained patient needs to be on bedrest  x 4 hours.

## 2019-08-19 NOTE — PROGRESS NOTE ADULT - ASSESSMENT
74 year old German-speaking male with PMH of HTN, CVA with no residual neurologic deficits, and GERD presents from [facility] for an IWSTEMI, admitted for ACS protocol and ischemic work up, s/p LHC with 90% pLCX s/p DIAZ x1 for staged PCI today

## 2019-08-20 DIAGNOSIS — E87.1 HYPO-OSMOLALITY AND HYPONATREMIA: ICD-10-CM

## 2019-08-20 DIAGNOSIS — R10.32 LEFT LOWER QUADRANT PAIN: ICD-10-CM

## 2019-08-20 DIAGNOSIS — R55 SYNCOPE AND COLLAPSE: ICD-10-CM

## 2019-08-20 LAB
ANION GAP SERPL CALC-SCNC: 15 MMOL/L — SIGNIFICANT CHANGE UP (ref 5–17)
BUN SERPL-MCNC: 23 MG/DL — SIGNIFICANT CHANGE UP (ref 7–23)
CALCIUM SERPL-MCNC: 9.8 MG/DL — SIGNIFICANT CHANGE UP (ref 8.4–10.5)
CHLORIDE SERPL-SCNC: 93 MMOL/L — LOW (ref 96–108)
CO2 SERPL-SCNC: 20 MMOL/L — LOW (ref 22–31)
CREAT ?TM UR-MCNC: 114 MG/DL — SIGNIFICANT CHANGE UP
CREAT SERPL-MCNC: 1.27 MG/DL — SIGNIFICANT CHANGE UP (ref 0.5–1.3)
GLUCOSE BLDC GLUCOMTR-MCNC: 144 MG/DL — HIGH (ref 70–99)
GLUCOSE SERPL-MCNC: 158 MG/DL — HIGH (ref 70–99)
HCT VFR BLD CALC: 37.3 % — LOW (ref 39–50)
HGB BLD-MCNC: 12.1 G/DL — LOW (ref 13–17)
MAGNESIUM SERPL-MCNC: 2.1 MG/DL — SIGNIFICANT CHANGE UP (ref 1.6–2.6)
MCHC RBC-ENTMCNC: 30 PG — SIGNIFICANT CHANGE UP (ref 27–34)
MCHC RBC-ENTMCNC: 32.5 GM/DL — SIGNIFICANT CHANGE UP (ref 32–36)
MCV RBC AUTO: 92.3 FL — SIGNIFICANT CHANGE UP (ref 80–100)
OSMOLALITY UR: 672 MOSM/KG — SIGNIFICANT CHANGE UP (ref 50–1200)
PLATELET # BLD AUTO: 257 K/UL — SIGNIFICANT CHANGE UP (ref 150–400)
POTASSIUM SERPL-MCNC: 4.1 MMOL/L — SIGNIFICANT CHANGE UP (ref 3.5–5.3)
POTASSIUM SERPL-SCNC: 4.1 MMOL/L — SIGNIFICANT CHANGE UP (ref 3.5–5.3)
RBC # BLD: 4.04 M/UL — LOW (ref 4.2–5.8)
RBC # FLD: 11.9 % — SIGNIFICANT CHANGE UP (ref 10.3–14.5)
SODIUM SERPL-SCNC: 128 MMOL/L — LOW (ref 135–145)
SODIUM UR-SCNC: 103 MMOL/L — SIGNIFICANT CHANGE UP
WBC # BLD: 12.8 K/UL — HIGH (ref 3.8–10.5)
WBC # FLD AUTO: 12.8 K/UL — HIGH (ref 3.8–10.5)

## 2019-08-20 PROCEDURE — 93010 ELECTROCARDIOGRAM REPORT: CPT

## 2019-08-20 PROCEDURE — 99233 SBSQ HOSP IP/OBS HIGH 50: CPT

## 2019-08-20 PROCEDURE — 71045 X-RAY EXAM CHEST 1 VIEW: CPT | Mod: 26

## 2019-08-20 RX ORDER — ACETAMINOPHEN 500 MG
650 TABLET ORAL ONCE
Refills: 0 | Status: COMPLETED | OUTPATIENT
Start: 2019-08-20 | End: 2019-08-20

## 2019-08-20 RX ORDER — BENZOCAINE AND MENTHOL 5; 1 G/100ML; G/100ML
1 LIQUID ORAL EVERY 4 HOURS
Refills: 0 | Status: DISCONTINUED | OUTPATIENT
Start: 2019-08-20 | End: 2019-08-26

## 2019-08-20 RX ORDER — SODIUM CHLORIDE 9 MG/ML
500 INJECTION INTRAMUSCULAR; INTRAVENOUS; SUBCUTANEOUS ONCE
Refills: 0 | Status: COMPLETED | OUTPATIENT
Start: 2019-08-20 | End: 2019-08-20

## 2019-08-20 RX ORDER — SODIUM CHLORIDE 9 MG/ML
500 INJECTION, SOLUTION INTRAVENOUS ONCE
Refills: 0 | Status: COMPLETED | OUTPATIENT
Start: 2019-08-20 | End: 2019-08-20

## 2019-08-20 RX ADMIN — TICAGRELOR 90 MILLIGRAM(S): 90 TABLET ORAL at 17:27

## 2019-08-20 RX ADMIN — Medication 650 MILLIGRAM(S): at 21:14

## 2019-08-20 RX ADMIN — ATORVASTATIN CALCIUM 80 MILLIGRAM(S): 80 TABLET, FILM COATED ORAL at 21:14

## 2019-08-20 RX ADMIN — SODIUM CHLORIDE 500 MILLILITER(S): 9 INJECTION, SOLUTION INTRAVENOUS at 15:32

## 2019-08-20 RX ADMIN — SODIUM CHLORIDE 100 MILLILITER(S): 9 INJECTION INTRAMUSCULAR; INTRAVENOUS; SUBCUTANEOUS at 09:52

## 2019-08-20 RX ADMIN — TICAGRELOR 90 MILLIGRAM(S): 90 TABLET ORAL at 06:47

## 2019-08-20 RX ADMIN — TAMSULOSIN HYDROCHLORIDE 0.4 MILLIGRAM(S): 0.4 CAPSULE ORAL at 21:14

## 2019-08-20 RX ADMIN — HEPARIN SODIUM 5000 UNIT(S): 5000 INJECTION INTRAVENOUS; SUBCUTANEOUS at 17:27

## 2019-08-20 RX ADMIN — QUETIAPINE FUMARATE 25 MILLIGRAM(S): 200 TABLET, FILM COATED ORAL at 21:14

## 2019-08-20 RX ADMIN — Medication 3 MILLIGRAM(S): at 21:14

## 2019-08-20 RX ADMIN — Medication 81 MILLIGRAM(S): at 11:38

## 2019-08-20 RX ADMIN — HEPARIN SODIUM 5000 UNIT(S): 5000 INJECTION INTRAVENOUS; SUBCUTANEOUS at 06:47

## 2019-08-20 RX ADMIN — CHLORHEXIDINE GLUCONATE 1 APPLICATION(S): 213 SOLUTION TOPICAL at 09:50

## 2019-08-20 RX ADMIN — Medication 12.5 MILLIGRAM(S): at 06:47

## 2019-08-20 RX ADMIN — Medication 12.5 MILLIGRAM(S): at 17:27

## 2019-08-20 NOTE — CHART NOTE - NSCHARTNOTEFT_GEN_A_CORE
Patient is a 74y old  Male who presents with a chief complaint of IWSTEMI (19 Aug 2019 17:33)  Informed by RN this AM of poss. pre-syncopal/syncopal event  Per RN, pt stood out of bed on his own, walked to the trash can, and cleared his throat  While 2 RN's were attempting to walk him back to bed, he was noted to weaken and fall back, after which he was helped back to bed with   PCA  Pt remains in bed at this time, observed to be asleep, but easily arousable, followed my commands when shown what to do, as pt is   Slovenian speaking. He is able to move all 4 extremities, and had equal hand   On tele, during event, pt was in 's  EKG done revealed NSR @ 94 bmp and no acute ST T changes  Pt is s/p DIAZ --> LCX 8/14,  and DIAZ --> pLAD & D1 8/19  VSS        Vital Signs Last 24 Hrs  T(C): 36.7 (20 Aug 2019 04:02), Max: 37.1 (19 Aug 2019 20:08)  T(F): 98.1 (20 Aug 2019 04:02), Max: 98.8 (19 Aug 2019 20:08)  HR: 100 (20 Aug 2019 04:02) (79 - 100)  BP: 109/71 (20 Aug 2019 04:02) (109/71 - 176/84)  BP(mean): --  RR: 18 (20 Aug 2019 04:02) (18 - 18)  SpO2: 100% (20 Aug 2019 04:02) (98% - 100%)      Labs:                          11.7   9.2   )-----------( 192      ( 18 Aug 2019 13:33 )             33.2     08-19    133<L>  |  98  |  18  ----------------------------<  129<H>  4.2   |  22  |  1.29    Ca    9.8      19 Aug 2019 06:33  Mg     1.9     08-19              Radiology:    Physical Exam:  General: WN/WD NAD  Neurology: Lethargic, easily arousable, nonfocal, CLEMENTS x 4, followed commands when shown   Head:  Normocephalic, atraumatic  Respiratory: CTA B/L  CV: RRR, S1S2, no murmur  Abdominal: Soft, Non tender, non distended, + BS  MSK: No edema, + peripheral pulses, FROM all 4 extremity    Assessment & Plan:  HPI:  74 year old Slovenian-speaking male with PMH of HTN, CVA (2017) with no residual deficits and GERD presents from [facility] for an IWSTEMI. For the past week, the patient has been having slight chest pain which he describes as burning. He went to his doctor who thought the chest pain was GI related, and was prescribed ranitidine and Antacid Plus. These medications did not improve his symptoms. His chest pain worsened with use of these medications. He was also noted to be newly hypertensive, so he was prescribed amlodipine. Yesterday at 6:00 pm, he started to experience 10/10 burning pain that radiated to his abdomen and back. The patient also reports associated dizziness. The pain was not associated with shortness of breath, diaphoresis, jaw pain, shoulder pain, nausea or vomiting. At 10:30 pm, he went to [facility] where he was found to be bradycardic (HR 57 bpm) and hypertensive (/81 mm Hg). EKG showed ST elevations in II, III, and AVF. He was loaded with ASA and brilinta, and was given a heparin bolus. He was transferred to [facility] for further management. [facility] EF 65%, 90% pLCX s/p DIAZ x1, 90% LAD, 90% Ramus. (15 Aug 2019 01:37)    Pt seen for ?presyncopal/syncopal episode this AM, as witnessed by primary RN, after pt suddenly stood up from bed  During episode, pt noted to be in ST on tele  EKG done this AM revealed NSR 90's with no acute ST  T changes    PLAN:  >Continue to monitor  >Continue DAPT, BB, Statin  >Endorse to day team; follow up per Attending

## 2019-08-20 NOTE — CHART NOTE - NSCHARTNOTEFT_GEN_A_CORE
Medicine PA Kimmy CAZARESJESSICA RODRIGUEZ  [id number]      74 year old Uzbek-speaking male with PMH of HTN, CVA (2017) with no residual deficits and GERD     Notified by RN for patient with fever 100.4, unable to assess ROS 2/2 mental status (a&ox1), family at bedside, state patient with intermittent chills.     Vital Signs Last 24 Hrs  T(C): 38 (08-20-19 @ 20:00), Max: 38 (08-20-19 @ 20:00)  T(F): 100.4 (08-20-19 @ 20:00), Max: 100.4 (08-20-19 @ 20:00)  HR: 90 (08-20-19 @ 20:00) (79 - 101)  BP: 166/79 (08-20-19 @ 20:00) (98/66 - 166/79)  BP(mean): --  RR: 18 (08-20-19 @ 20:00) (18 - 18)  SpO2: 96% (08-20-19 @ 20:00) (96% - 100%)  Daily     Daily   I&O's Summary    19 Aug 2019 07:01  -  20 Aug 2019 07:00  --------------------------------------------------------  IN: 0 mL / OUT: 0 mL / NET: 0 mL    20 Aug 2019 07:01  -  20 Aug 2019 23:12  --------------------------------------------------------  IN: 685 mL / OUT: 0 mL / NET: 685 mL      CAPILLARY BLOOD GLUCOSE                          12.1   12.8  )-----------( 257      ( 20 Aug 2019 06:55 )             37.3     08-20    128<L>  |  93<L>  |  23  ----------------------------<  158<H>  4.1   |  20<L>  |  1.27    Ca    9.8      20 Aug 2019 06:55  Mg     2.1     08-20        PHYSICAL EXAM:  GENERAL: NAD,   CHEST/LUNG: Clear to auscultation bilaterally;   HEART: Regular rate and rhythm;   ABDOMEN: Soft, Nontender, Nondistended; Bowel sounds present    Assessment/Plan: febrile, r/o infection   - VSS aside from temp 100.4 (no hypotension/tachpynea or tachycardia)  - stable on tele   - tylenol x 1   - patient pancultured -- > blood cx x 2 / ua/ucx / CXR -- > please f/u full report   - will begin broad spectrum abx if patient clinically decompensates   - consider ID consult if persists   - will monitor patient closely overnight   - will endorse events to day team   - attending to follow overnight      Neal Riddle PA-C,   Department of Medicine

## 2019-08-20 NOTE — PHYSICAL THERAPY INITIAL EVALUATION ADULT - PERTINENT HX OF CURRENT PROBLEM, REHAB EVAL
8/15 TTE with Doppler with findings of Hyperdynamic left ventricular systolic function. LHC on 8/15 showed 90% stenosis of LAD, proximal circumflex and 30% stenosis of RCA.8/19 pt had LHC for placement of drug-eluting stent on the lesion in the proximal LAD.

## 2019-08-20 NOTE — PROGRESS NOTE ADULT - ASSESSMENT
A/P: 74 year old M pt with PMH of HTN, CVA, and GERD transferred from [facility] for NSTEMI.    - s/p LHC via L groin with DIAZ x1 to pLAD and D1  - groin site intact with no hematoma  - ekg and telemetry reviewed, sinus rhythm  - c/w aspirin, brilinta, atorvastatin  - increase lopressor to 25mg bid  - start lisinopril 2.5mg daily    Oscar Fletcher, #[phone]  Cardiology Fellow

## 2019-08-20 NOTE — PROVIDER CONTACT NOTE (OTHER) - SITUATION
pt noncompliant and syncopized while attempting to get OOB. bed alarm was set, wife at bedside along with 2 RNs. pt noncompliant and syncopized while attempting to get OOB. bed alarm was set, wife at bedside along with 2 RNs. PCA carried pt back into bed safely during syncopal episode. pt was Sinus tach 120s

## 2019-08-20 NOTE — PHYSICAL THERAPY INITIAL EVALUATION ADULT - ADDITIONAL COMMENTS
Pt and spouse lives with daughter and family in a private home, no stairs to enter. Pt was indipendent with all ADLs. Pt and spouse lives with daughter and family in a private home, no stairs to enter. Pt was independent with all ADLs. Pt and spouse lives with daughter and family in a private home, 3 stairs to enter. Pt was independent with all ADLs.

## 2019-08-20 NOTE — PHYSICAL THERAPY INITIAL EVALUATION ADULT - GENERAL OBSERVATIONS, REHAB EVAL
74 yrs old male patient with PMH of HTN, CVA (2017) with no residual deficits, referred to the ED for IWSTEMI. Pt has been having chest pain for the past week which was treated as GI related pain which didnt improve overtime. In the OSH pt was bradicardic and hypertensive. The pt had +ve ECG showing ST elevations in II, III, and AVF which was acutely managed and transferred to [facility].

## 2019-08-20 NOTE — PROGRESS NOTE ADULT - SUBJECTIVE AND OBJECTIVE BOX
Cardiology Progress Note    Interval:  #[id number]. Pt resting comfortably. Per , pt AOx1 and not making sense, which per wife and nursing staff has been at baseline prior to [facility] on 8/19. Chest pain improved but not fully resolved.    Tele: Sinus rhythm    HPI:  74 year old English-speaking male with PMH of HTN, CVA (2017) with no residual deficits and GERD presents from [facility] for an IWSTEMI. For the past week, the patient has been having slight chest pain which he describes as burning. He went to his doctor who thought the chest pain was GI related, and was prescribed ranitidine and Antacid Plus. These medications did not improve his symptoms. His chest pain worsened with use of these medications. He was also noted to be newly hypertensive, so he was prescribed amlodipine. Yesterday at 6:00 pm, he started to experience 10/10 burning pain that radiated to his abdomen and back. The patient also reports associated dizziness. The pain was not associated with shortness of breath, diaphoresis, jaw pain, shoulder pain, nausea or vomiting. At 10:30 pm, he went to [facility] where he was found to be bradycardic (HR 57 bpm) and hypertensive (/81 mm Hg). EKG showed ST elevations in II, III, and AVF. He was loaded with ASA and brilinta, and was given a heparin bolus. He was transferred to [facility] for further management. [facility] EF 65%, 90% pLCX s/p DIAZ x1, 90% LAD, 90% Ramus. (15 Aug 2019 01:37)      Medications:  aspirin  chewable 81 milliGRAM(s) Oral daily  atorvastatin 80 milliGRAM(s) Oral at bedtime  chlorhexidine 2% Cloths 1 Application(s) Topical at bedtime  famotidine    Tablet 20 milliGRAM(s) Oral daily PRN  heparin  Injectable 5000 Unit(s) SubCutaneous every 12 hours  melatonin 3 milliGRAM(s) Oral at bedtime PRN  metoprolol tartrate 12.5 milliGRAM(s) Oral every 12 hours  QUEtiapine 25 milliGRAM(s) Oral at bedtime  sodium chloride 0.9%. 1000 milliLiter(s) IV Continuous <Continuous>  sodium chloride 0.9%. 1000 milliLiter(s) IV Continuous <Continuous>  tamsulosin 0.4 milliGRAM(s) Oral at bedtime  ticagrelor 90 milliGRAM(s) Oral every 12 hours      Review of Systems:  Constitutional: [ ] Fever [ ] Chills [ ] Fatigue [ ] Weight change   HEENT: [ ] Blurred vision [ ] Eye Pain [ ] Headache [ ] Runny nose [ ] Sore Throat   Respiratory: [ ] Cough [ ] Wheezing [ ] Shortness of breath  Cardiovascular: [ ] Chest Pain [ ] Palpitations [ ] ARROYO [ ] PND [ ] Orthopnea  Gastrointestinal: [ ] Abdominal Pain [ ] Diarrhea [ ] Constipation [ ] Hemorrhoids [ ] Nausea [ ] Vomiting  Genitourinary: [ ] Nocturia [ ] Dysuria [ ] Incontinence  Extremities: [ ] Swelling [ ] Joint Pain  Neurologic: [ ] Focal deficit [ ] Paresthesias [ ] Syncope  Lymphatic: [ ] Swelling [ ] Lymphadenopathy   Skin: [ ] Rash [ ] Ecchymoses [ ] Wounds [ ] Lesions  Psychiatry: [ ] Depression [ ] Suicidal/Homicidal Ideation [ ] Anxiety [ ] Sleep Disturbances  [ ] 10 point review of systems is otherwise negative except as mentioned above            [ ]Unable to obtain    Vitals:  T(C): 37.1 (08-20-19 @ 05:08), Max: 37.1 (08-19-19 @ 20:08)  HR: 88 (08-20-19 @ 05:44) (79 - 100)  BP: 98/66 (08-20-19 @ 05:44) (98/66 - 176/84)  BP(mean): --  RR: 18 (08-20-19 @ 05:44) (18 - 18)  SpO2: 100% (08-20-19 @ 05:44) (97% - 100%)  Wt(kg): --  Daily     Daily   I&O's Summary    19 Aug 2019 07:01  -  20 Aug 2019 07:00  --------------------------------------------------------  IN: 0 mL / OUT: 0 mL / NET: 0 mL    20 Aug 2019 07:01  -  20 Aug 2019 09:59  --------------------------------------------------------  IN: 290 mL / OUT: 0 mL / NET: 290 mL        Physical Exam:  General: NAD  Eye: PERRL, EOMI  HENT: Normal oral mucosa NC/AT  CV: Normal S1/S2, RRR, No M/R/G, no edema, no elevation in JVP, L groin site intact with no bleeding complications  Resp: Normal respiratory effort, clear to auscultation bilaterally, no wheezing, no crackles  Abd: Soft, Non-tender, Non-distended, BS+  Ext: No clubbing, No joint deformity   Neuro: Non-focal, motor and sensory intact  Lymph: No lymphadenopathy  Psych: AAOx1, Mood & affect appropriate  Skin: No rashes, No ecchymoses, No cyanosis    Labs:                        12.1   12.8  )-----------( 257      ( 20 Aug 2019 06:55 )             37.3     08-20    128<L>  |  93<L>  |  23  ----------------------------<  158<H>  4.1   |  20<L>  |  1.27    Ca    9.8      20 Aug 2019 06:55  Mg     2.1     08-20                    New results/imaging:

## 2019-08-20 NOTE — CHART NOTE - NSCHARTNOTEFT_GEN_A_CORE
Orthostatics positive, providing addition IVF bolus. Orthostatics positive, urine lytes pre-renal; providing addition IVF bolus.

## 2019-08-20 NOTE — PROGRESS NOTE ADULT - ASSESSMENT
74 year old Kinyarwanda-speaking male with PMH of HTN, CVA with no residual neurologic deficits, and GERD presents from [facility] for an IWSTEMI, admitted for ACS protocol and ischemic work up, s/p LHC with 90% pLCX s/p DIAZ x1 for staged PCI today

## 2019-08-20 NOTE — PROGRESS NOTE ADULT - PROBLEM SELECTOR PLAN 7
Continues to remain agitated, particularly overnight. He has underlying dementia (per daughter).  - Delirium precautions: frequent reorientation, maintain day-night cycle  - NO BENZO  - Melatonin 3mg qhs prn, will add seroquel 25 mg qPM and PRN Haldol for agitation

## 2019-08-20 NOTE — PROGRESS NOTE ADULT - SUBJECTIVE AND OBJECTIVE BOX
Esperanza Key MD  Attending Physician (Hospitalist)  pager: [phone]    After 5PM please contact Hospitalist Pager [phone]    Patient is a 74y old  Male who presents with a chief complaint of IWSTEMI (17 Aug 2019 16:43)        SUBJECTIVE / OVERNIGHT EVENTS: I obtained history via  # [id number] from wife and patient at bedside  Slept well. His wife says he barely ate a thing yesterday because he was on bedrest. Then this morning he woke up at 5 to pee. Sat up on the side of the bed and stood up and became dizzy and light headed. He laid back in bed and felt immediately improved. No palpitations. No arrythmias on telemetry. No LOC.   No recurrence of CP, SOB, cough, palp. he is complaining of pain near groin site during my exam.     MEDICATIONS  (STANDING):  aspirin  chewable 81 milliGRAM(s) Oral daily  atorvastatin 80 milliGRAM(s) Oral at bedtime  chlorhexidine 2% Cloths 1 Application(s) Topical at bedtime  heparin  Injectable 5000 Unit(s) SubCutaneous every 12 hours  metoprolol tartrate 12.5 milliGRAM(s) Oral every 12 hours  QUEtiapine 25 milliGRAM(s) Oral at bedtime  sodium chloride 0.9%. 1000 milliLiter(s) (75 mL/Hr) IV Continuous <Continuous>  tamsulosin 0.4 milliGRAM(s) Oral at bedtime  ticagrelor 90 milliGRAM(s) Oral every 12 hours    MEDICATIONS  (PRN):  famotidine    Tablet 20 milliGRAM(s) Oral daily PRN reflex, indigestion  melatonin 3 milliGRAM(s) Oral at bedtime PRN Insomnia      Vital Signs Last 24 Hrs  T(C): 36.3 (18 Aug 2019 11:15), Max: 36.9 (17 Aug 2019 20:04)  T(F): 97.4 (18 Aug 2019 11:15), Max: 98.4 (17 Aug 2019 20:04)  HR: 75 (18 Aug 2019 11:15) (75 - 91)  BP: 176/82 (18 Aug 2019 11:15) (122/72 - 176/82)  BP(mean): --  RR: 18 (18 Aug 2019 11:15) (18 - 18)  SpO2: 98% (18 Aug 2019 11:15) (97% - 98%)  CAPILLARY BLOOD GLUCOSE        I&O's Summary    17 Aug 2019 07:01  -  18 Aug 2019 07:00  --------------------------------------------------------  IN: 940 mL / OUT: 1100 mL / NET: -160 mL    18 Aug 2019 07:01  -  18 Aug 2019 12:51  --------------------------------------------------------  IN: 240 mL / OUT: 450 mL / NET: -210 mL          PHYSICAL EXAM  GENERAL: NAD- resting at time of my exam. Thin elderly male  HEAD:  Atraumatic, Normocephalic  EYES: EOMI, PERRLA, conjunctiva and sclera clear  NECK: Supple, No JVD  CHEST/LUNG: Clear to auscultation bilaterally; No wheeze  HEART: Regular rate and rhythm; No murmurs, rubs, or gallops  ABDOMEN: Soft, Nontender, Nondistended; Bowel sounds present  EXTREMITIES:  2+ Peripheral Pulses, No clubbing, cyanosis, or edema. Groin site examined, no hematoma, non tender to palpation, no appreciable bruit  PSYCH: AAOx1, appropriate mood and affect  SKIN: No rashes or lesions    LABS:                        11.2   8.7   )-----------( 187      ( 17 Aug 2019 17:51 )             32.9     08-18    137  |  99  |  15  ----------------------------<  148<H>  3.4<L>   |  23  |  1.11    Ca    9.4      18 Aug 2019 06:08                  RADIOLOGY & ADDITIONAL TESTS:      Tele reviewed: SR   Consultant(s) Notes Reviewed:  Cardiology  Care Discussed with Consultants/Other Providers: Medicine NP

## 2019-08-21 DIAGNOSIS — R07.89 OTHER CHEST PAIN: ICD-10-CM

## 2019-08-21 DIAGNOSIS — D64.9 ANEMIA, UNSPECIFIED: ICD-10-CM

## 2019-08-21 DIAGNOSIS — R50.9 FEVER, UNSPECIFIED: ICD-10-CM

## 2019-08-21 LAB
ANION GAP SERPL CALC-SCNC: 11 MMOL/L — SIGNIFICANT CHANGE UP (ref 5–17)
BUN SERPL-MCNC: 22 MG/DL — SIGNIFICANT CHANGE UP (ref 7–23)
CALCIUM SERPL-MCNC: 8.7 MG/DL — SIGNIFICANT CHANGE UP (ref 8.4–10.5)
CHLORIDE SERPL-SCNC: 95 MMOL/L — LOW (ref 96–108)
CK MB CFR SERPL CALC: 3.3 NG/ML — SIGNIFICANT CHANGE UP (ref 0–6.7)
CK MB CFR SERPL CALC: 3.5 NG/ML — SIGNIFICANT CHANGE UP (ref 0–6.7)
CO2 SERPL-SCNC: 23 MMOL/L — SIGNIFICANT CHANGE UP (ref 22–31)
CREAT SERPL-MCNC: 1.22 MG/DL — SIGNIFICANT CHANGE UP (ref 0.5–1.3)
GLUCOSE SERPL-MCNC: 126 MG/DL — HIGH (ref 70–99)
HCT VFR BLD CALC: 33.6 % — LOW (ref 39–50)
HGB BLD-MCNC: 11.3 G/DL — LOW (ref 13–17)
MCHC RBC-ENTMCNC: 30.8 PG — SIGNIFICANT CHANGE UP (ref 27–34)
MCHC RBC-ENTMCNC: 33.7 GM/DL — SIGNIFICANT CHANGE UP (ref 32–36)
MCV RBC AUTO: 91.5 FL — SIGNIFICANT CHANGE UP (ref 80–100)
PLATELET # BLD AUTO: 227 K/UL — SIGNIFICANT CHANGE UP (ref 150–400)
POTASSIUM SERPL-MCNC: 3.6 MMOL/L — SIGNIFICANT CHANGE UP (ref 3.5–5.3)
POTASSIUM SERPL-SCNC: 3.6 MMOL/L — SIGNIFICANT CHANGE UP (ref 3.5–5.3)
RBC # BLD: 3.67 M/UL — LOW (ref 4.2–5.8)
RBC # FLD: 12 % — SIGNIFICANT CHANGE UP (ref 10.3–14.5)
SODIUM SERPL-SCNC: 129 MMOL/L — LOW (ref 135–145)
TROPONIN T, HIGH SENSITIVITY RESULT: 141 NG/L — HIGH (ref 0–51)
TROPONIN T, HIGH SENSITIVITY RESULT: 165 NG/L — HIGH (ref 0–51)
WBC # BLD: 17.5 K/UL — HIGH (ref 3.8–10.5)
WBC # FLD AUTO: 17.5 K/UL — HIGH (ref 3.8–10.5)

## 2019-08-21 PROCEDURE — 93010 ELECTROCARDIOGRAM REPORT: CPT

## 2019-08-21 PROCEDURE — 93970 EXTREMITY STUDY: CPT | Mod: 26

## 2019-08-21 PROCEDURE — 99233 SBSQ HOSP IP/OBS HIGH 50: CPT

## 2019-08-21 RX ORDER — LISINOPRIL 2.5 MG/1
2.5 TABLET ORAL DAILY
Refills: 0 | Status: DISCONTINUED | OUTPATIENT
Start: 2019-08-21 | End: 2019-08-26

## 2019-08-21 RX ORDER — METOPROLOL TARTRATE 50 MG
25 TABLET ORAL
Refills: 0 | Status: DISCONTINUED | OUTPATIENT
Start: 2019-08-21 | End: 2019-08-26

## 2019-08-21 RX ORDER — SIMETHICONE 80 MG/1
80 TABLET, CHEWABLE ORAL ONCE
Refills: 0 | Status: COMPLETED | OUTPATIENT
Start: 2019-08-21 | End: 2019-08-21

## 2019-08-21 RX ORDER — PANTOPRAZOLE SODIUM 20 MG/1
40 TABLET, DELAYED RELEASE ORAL
Refills: 0 | Status: DISCONTINUED | OUTPATIENT
Start: 2019-08-21 | End: 2019-08-26

## 2019-08-21 RX ORDER — SODIUM CHLORIDE 9 MG/ML
1000 INJECTION INTRAMUSCULAR; INTRAVENOUS; SUBCUTANEOUS ONCE
Refills: 0 | Status: COMPLETED | OUTPATIENT
Start: 2019-08-21 | End: 2019-08-21

## 2019-08-21 RX ORDER — PANTOPRAZOLE SODIUM 20 MG/1
40 TABLET, DELAYED RELEASE ORAL ONCE
Refills: 0 | Status: COMPLETED | OUTPATIENT
Start: 2019-08-21 | End: 2019-08-21

## 2019-08-21 RX ADMIN — QUETIAPINE FUMARATE 25 MILLIGRAM(S): 200 TABLET, FILM COATED ORAL at 20:53

## 2019-08-21 RX ADMIN — FAMOTIDINE 20 MILLIGRAM(S): 10 INJECTION INTRAVENOUS at 09:32

## 2019-08-21 RX ADMIN — TAMSULOSIN HYDROCHLORIDE 0.4 MILLIGRAM(S): 0.4 CAPSULE ORAL at 20:53

## 2019-08-21 RX ADMIN — ATORVASTATIN CALCIUM 80 MILLIGRAM(S): 80 TABLET, FILM COATED ORAL at 20:52

## 2019-08-21 RX ADMIN — LISINOPRIL 2.5 MILLIGRAM(S): 2.5 TABLET ORAL at 17:30

## 2019-08-21 RX ADMIN — Medication 25 MILLIGRAM(S): at 17:30

## 2019-08-21 RX ADMIN — TICAGRELOR 90 MILLIGRAM(S): 90 TABLET ORAL at 05:29

## 2019-08-21 RX ADMIN — Medication 12.5 MILLIGRAM(S): at 05:29

## 2019-08-21 RX ADMIN — PANTOPRAZOLE SODIUM 40 MILLIGRAM(S): 20 TABLET, DELAYED RELEASE ORAL at 17:30

## 2019-08-21 RX ADMIN — HEPARIN SODIUM 5000 UNIT(S): 5000 INJECTION INTRAVENOUS; SUBCUTANEOUS at 17:30

## 2019-08-21 RX ADMIN — Medication 81 MILLIGRAM(S): at 09:32

## 2019-08-21 RX ADMIN — PANTOPRAZOLE SODIUM 40 MILLIGRAM(S): 20 TABLET, DELAYED RELEASE ORAL at 23:35

## 2019-08-21 RX ADMIN — Medication 3 MILLIGRAM(S): at 20:53

## 2019-08-21 RX ADMIN — TICAGRELOR 90 MILLIGRAM(S): 90 TABLET ORAL at 17:30

## 2019-08-21 RX ADMIN — SODIUM CHLORIDE 200 MILLILITER(S): 9 INJECTION INTRAMUSCULAR; INTRAVENOUS; SUBCUTANEOUS at 09:29

## 2019-08-21 RX ADMIN — SIMETHICONE 80 MILLIGRAM(S): 80 TABLET, CHEWABLE ORAL at 23:35

## 2019-08-21 RX ADMIN — HEPARIN SODIUM 5000 UNIT(S): 5000 INJECTION INTRAVENOUS; SUBCUTANEOUS at 05:29

## 2019-08-21 NOTE — DIETITIAN INITIAL EVALUATION ADULT. - ADD RECOMMEND
1. Recommend liberalize diet to low sodium therapeutic diet only. Halal per family preference. Continue to obtain/provide food preferences as feasible. Texture deferred to team. 2. Provide Glucerna 2x daily (Provides 570kcal, 28.4g protein) to promote adequate po intake and BG control. 3. Consider addition of multivitamin 4. Will continue to monitor nutrient intake, wt, labs, f/u per protocol

## 2019-08-21 NOTE — DIETITIAN INITIAL EVALUATION ADULT. - PHYSICAL APPEARANCE
other (specify)/Pt appears thin, visible muscle wasting to temporal and clavicle noted; possible mild fat loss to orbital fat pads noted. Nutrition focused physical exam deferred given pt cognitive status, agitated at times. Height: 62 inches, Weight: 109.3 pounds  BMI: 20.0 kg/m2 IBW: 118 pounds (+/-10%), %IBW: 92%  Pertinent Info: No edema noted, no pressure injuries noted at this time in nursing flow sheet.

## 2019-08-21 NOTE — PROVIDER CONTACT NOTE (OTHER) - SITUATION
Tele tech just notified RN now that at 0830 pt had an episode of PAT  x2 seconds. Pt walking with PT at that time

## 2019-08-21 NOTE — DIETITIAN INITIAL EVALUATION ADULT. - FACTORS AFF FOOD INTAKE
persistent nausea/change in mental status/Jew/ethnic/cultural/personal food preferences/persistent lack of appetite

## 2019-08-21 NOTE — PROVIDER CONTACT NOTE (CRITICAL VALUE NOTIFICATION) - ASSESSMENT
Pt is A&Ox1-2 denies chest pain, dizziness, palpitations, SOB. Pt had episode of chest pain at 1109, resolved without pharmacological intervention. Family remains at bedside.

## 2019-08-21 NOTE — PROGRESS NOTE ADULT - SUBJECTIVE AND OBJECTIVE BOX
Esperanza Key MD  Attending Physician (Hospitalist)  pager: [phone]    After 5PM please contact Hospitalist Pager [phone]    Patient is a 74y old  Male who presents with a chief complaint of IWSTEMI (17 Aug 2019 16:43)        SUBJECTIVE / OVERNIGHT EVENTS: Patient had a fever 100.4 overnight with rising leukocytosis. This morning he has no complaints or localizing symptoms. CXR, and cultures were drawn (he is refusing urine).  He did have chest pain this morning 7/10, resolved without intervention, without ECG changes. Troponin, CKMB drawn and cardiology made aware.    MEDICATIONS  (STANDING):  aspirin  chewable 81 milliGRAM(s) Oral daily  atorvastatin 80 milliGRAM(s) Oral at bedtime  chlorhexidine 2% Cloths 1 Application(s) Topical at bedtime  heparin  Injectable 5000 Unit(s) SubCutaneous every 12 hours  metoprolol tartrate 12.5 milliGRAM(s) Oral every 12 hours  QUEtiapine 25 milliGRAM(s) Oral at bedtime  sodium chloride 0.9%. 1000 milliLiter(s) (75 mL/Hr) IV Continuous <Continuous>  tamsulosin 0.4 milliGRAM(s) Oral at bedtime  ticagrelor 90 milliGRAM(s) Oral every 12 hours    MEDICATIONS  (PRN):  famotidine    Tablet 20 milliGRAM(s) Oral daily PRN reflex, indigestion  melatonin 3 milliGRAM(s) Oral at bedtime PRN Insomnia      Vital Signs Last 24 Hrs  T(C): 36.3 (18 Aug 2019 11:15), Max: 36.9 (17 Aug 2019 20:04)  T(F): 97.4 (18 Aug 2019 11:15), Max: 98.4 (17 Aug 2019 20:04)  HR: 75 (18 Aug 2019 11:15) (75 - 91)  BP: 176/82 (18 Aug 2019 11:15) (122/72 - 176/82)  BP(mean): --  RR: 18 (18 Aug 2019 11:15) (18 - 18)  SpO2: 98% (18 Aug 2019 11:15) (97% - 98%)  CAPILLARY BLOOD GLUCOSE        I&O's Summary    17 Aug 2019 07:01  -  18 Aug 2019 07:00  --------------------------------------------------------  IN: 940 mL / OUT: 1100 mL / NET: -160 mL    18 Aug 2019 07:01  -  18 Aug 2019 12:51  --------------------------------------------------------  IN: 240 mL / OUT: 450 mL / NET: -210 mL          PHYSICAL EXAM  GENERAL: NAD. Thin elderly male  HEAD:  Atraumatic, Normocephalic  EYES: EOMI, PERRLA, conjunctiva and sclera clear  NECK: Supple, No JVD  CHEST/LUNG: Clear to auscultation bilaterally; No wheeze  HEART: Regular rate and rhythm; No murmurs, rubs, or gallops  ABDOMEN: Soft, Nontender, Nondistended; Bowel sounds present  EXTREMITIES:  2+ Peripheral Pulses, No clubbing, cyanosis, or edema. Groin site examined, no hematoma, non tender to palpation, no appreciable bruit  PSYCH: AAOx1-2, appropriate mood and affect  SKIN: No rashes or lesions    LABS:                        11.2   8.7   )-----------( 187      ( 17 Aug 2019 17:51 )             32.9     08-18    137  |  99  |  15  ----------------------------<  148<H>  3.4<L>   |  23  |  1.11    Ca    9.4      18 Aug 2019 06:08                  RADIOLOGY & ADDITIONAL TESTS:    Imaging reviewed: CXR: Clear lungs  Tele reviewed: SR 70-80, short episode of PAT ~160 for 2 seconds.  Consultant(s) Notes Reviewed:  Cardiology  Care Discussed with Consultants/Other Providers: Medicine NP Esperanza Key MD  Attending Physician (Hospitalist)  pager: [phone]    After 5PM please contact Hospitalist Pager [phone]    Patient is a 74y old  Male who presents with a chief complaint of IWSTEMI (17 Aug 2019 16:43)        SUBJECTIVE / OVERNIGHT EVENTS: Patient had a fever 100.4 overnight with rising leukocytosis. This morning he has no complaints or localizing symptoms. CXR, and cultures were drawn (he is refusing urine).  He did have chest pain this morning 7/10, resolved without intervention, without ECG changes. Troponin, CKMB drawn and cardiology made aware.    MEDICATIONS  (STANDING):  aspirin  chewable 81 milliGRAM(s) Oral daily  atorvastatin 80 milliGRAM(s) Oral at bedtime  chlorhexidine 2% Cloths 1 Application(s) Topical at bedtime  heparin  Injectable 5000 Unit(s) SubCutaneous every 12 hours  metoprolol tartrate 12.5 milliGRAM(s) Oral every 12 hours  QUEtiapine 25 milliGRAM(s) Oral at bedtime  sodium chloride 0.9%. 1000 milliLiter(s) (75 mL/Hr) IV Continuous <Continuous>  tamsulosin 0.4 milliGRAM(s) Oral at bedtime  ticagrelor 90 milliGRAM(s) Oral every 12 hours    MEDICATIONS  (PRN):  famotidine    Tablet 20 milliGRAM(s) Oral daily PRN reflex, indigestion  melatonin 3 milliGRAM(s) Oral at bedtime PRN Insomnia      Vital Signs Last 24 Hrs  T(C): 36.3 (18 Aug 2019 11:15), Max: 36.9 (17 Aug 2019 20:04)  T(F): 97.4 (18 Aug 2019 11:15), Max: 98.4 (17 Aug 2019 20:04)  HR: 75 (18 Aug 2019 11:15) (75 - 91)  BP: 176/82 (18 Aug 2019 11:15) (122/72 - 176/82)  BP(mean): --  RR: 18 (18 Aug 2019 11:15) (18 - 18)  SpO2: 98% (18 Aug 2019 11:15) (97% - 98%)  CAPILLARY BLOOD GLUCOSE        I&O's Summary    17 Aug 2019 07:01  -  18 Aug 2019 07:00  --------------------------------------------------------  IN: 940 mL / OUT: 1100 mL / NET: -160 mL    18 Aug 2019 07:01  -  18 Aug 2019 12:51  --------------------------------------------------------  IN: 240 mL / OUT: 450 mL / NET: -210 mL          PHYSICAL EXAM  GENERAL: NAD. Thin elderly male  HEAD:  Atraumatic, Normocephalic  EYES: EOMI, PERRLA, conjunctiva and sclera clear  NECK: Supple, No JVD  CHEST/LUNG: Clear to auscultation bilaterally; No wheeze  HEART: Regular rate and rhythm; No murmurs, rubs, or gallops  ABDOMEN: Soft, Nontender, Nondistended; Bowel sounds present  EXTREMITIES:  2+ Peripheral Pulses, No clubbing, cyanosis, or edema. Groin site examined, no hematoma, non tender to palpation, no appreciable bruit  PSYCH: AAOx1-2, appropriate mood and affect  SKIN: No rashes or lesions    LABS:                        11.2   8.7   )-----------( 187      ( 17 Aug 2019 17:51 )             32.9     08-18    137  |  99  |  15  ----------------------------<  148<H>  3.4<L>   |  23  |  1.11    Ca    9.4      18 Aug 2019 06:08                  RADIOLOGY & ADDITIONAL TESTS:  ECG: NSR, no acute ST chabges  Imaging reviewed: CXR: Clear lungs  Tele reviewed: SR 70-80, short episode of PAT ~160 for 2 seconds.  Consultant(s) Notes Reviewed:  Cardiology  Care Discussed with Consultants/Other Providers: Medicine NP

## 2019-08-21 NOTE — PROGRESS NOTE ADULT - PROBLEM SELECTOR PLAN 8
Due to delirium with underlying dementia  Improved.  - Delirium precautions: frequent reorientation, maintain day-night cycle  - NO BENZO  - Melatonin 3mg qhs prn, continue seroquel 25 mg qPM and PRN Haldol for agitation

## 2019-08-21 NOTE — DIETITIAN INITIAL EVALUATION ADULT. - OTHER INFO
Pt is 74yoM with PMH significant for HTN, CVA with no residual deficits, GERD, transferred from [facility] for "IWSTEMI, admitted for ACS protocol and ischemic work up, s/p LHC with 90% pLCX s/p DIAZ x1, S/P staged PCI to ramus and LAD." Pt noted with HgbA1c indicative of "prediabetes" per chart. +TERRY, resolved. +hyponatremia. Course c/b agitation, pt with underlying dementia per family.     Subjective information obtained from pt daughter at bedside. Pt noted with 50-85% po intake in-patient, c/o nausea and dislike of institutional foods here. Ordered for pepcid at this time. At baseline, pt daughter states pt eats well with a good appetite. Pt lives with her and she provides him his meals daily.  Pt has no c/o diarrhea or constipation. Pt with chewing difficulty of some tougher foods, pt lacking dentition. No swallowing difficulties noted. Pt daughter declines need for texture modified diet, however food preferences obtained and will be updated to promote tolerance of diet.  Weight history: No wt changes PTA reported and pt daughter unable to provide pt UBW. Pt dosing wt noted as 109.3 lbs, pt reportedly thin at baseline.    Therapeutic Diet PTA: none    Nutrition Supplements PTA: No vitamin/mineral/herbal supplementation PTA reported. NKFA reported, pt family prefer Halal dietary restrictions.

## 2019-08-21 NOTE — PROGRESS NOTE ADULT - PROBLEM SELECTOR PLAN 10
dvt ppx: Continue SQH  Dispo: pending final cardiology recs, PT recs. Re-assess after pre-syncopal event with IVF today.
hx of CVA,   - c/w antiplatelet and statin as above    11. BPH: Continue flomax      DVT: SQH  Disposition: Pending cardiology recs re: ongoing CP, and resolution of fever, downtrending leukocytosis and resolution of electrolyte abn.  PT recommendation: HOME

## 2019-08-21 NOTE — CHART NOTE - NSCHARTNOTEFT_GEN_A_CORE
Notified by RN that patient c/o 7/10 non-radiating pressure-like chest pain.     ICU Vital Signs Last 24 Hrs  T(C): 37.7 (21 Aug 2019 11:09), Max: 38 (20 Aug 2019 20:00)  T(F): 99.9 (21 Aug 2019 11:09), Max: 100.4 (20 Aug 2019 20:00)  HR: 76 (21 Aug 2019 11:09) (76 - 101)  BP: 119/71 (21 Aug 2019 11:09) (119/71 - 166/79)  BP(mean): --  ABP: --  ABP(mean): --  RR: 18 (21 Aug 2019 11:09) (18 - 18)  SpO2: 96% (21 Aug 2019 11:09) (96% - 97%)    Pt seen and examined in chair. Pt appears to be in NAD, sitting comfortably in chair with daughter by his side. Chest pain has lessened somewhat, endorsing 5/10 chest pain at this time. Chest is CTA b/l, RRR, S1 and S2, no murmurs. Cardiology and attending notified. EKG, Troponin and CK-MB ordered STAT. Cardiology to come see patient. Will follow up cardiac enzymes and continue to monitor patient.      Mirtha Garza PA-C

## 2019-08-21 NOTE — PROVIDER CONTACT NOTE (OTHER) - ASSESSMENT
Pt is A&Ox1-2, restless, denies dizziness, palpitations, SOB. Pt complains of chest pain rated 7 out of 10 on the left anterior chest wall pt denies pain radiation. Pt describes pain as a tightness. EKG Performed at bedside. VSS. Daughter at bedside for translation as per pt request.

## 2019-08-21 NOTE — PROGRESS NOTE ADULT - PROBLEM SELECTOR PLAN 7
Site looks appropriate without hematoma. no bruit.  Blood counts are stable.   - Pain has resolved. Will continue to monitor closely

## 2019-08-21 NOTE — PROGRESS NOTE ADULT - SUBJECTIVE AND OBJECTIVE BOX
Cardiology Progress Note    Interval: Pt resting comfortably. Per daughter (bedside), pt no longer having chest pain. AOx1 (full name). No other complaints otherwise.    Tele:    HPI:  74 year old Vietnamese-speaking male with PMH of HTN, CVA (2017) with no residual deficits and GERD presents from [facility] for an IWSTEMI. For the past week, the patient has been having slight chest pain which he describes as burning. He went to his doctor who thought the chest pain was GI related, and was prescribed ranitidine and Antacid Plus. These medications did not improve his symptoms. His chest pain worsened with use of these medications. He was also noted to be newly hypertensive, so he was prescribed amlodipine. Yesterday at 6:00 pm, he started to experience 10/10 burning pain that radiated to his abdomen and back. The patient also reports associated dizziness. The pain was not associated with shortness of breath, diaphoresis, jaw pain, shoulder pain, nausea or vomiting. At 10:30 pm, he went to [facility] where he was found to be bradycardic (HR 57 bpm) and hypertensive (/81 mm Hg). EKG showed ST elevations in II, III, and AVF. He was loaded with ASA and brilinta, and was given a heparin bolus. He was transferred to [facility] for further management. [facility] EF 65%, 90% pLCX s/p DIAZ x1, 90% LAD, 90% Ramus. (15 Aug 2019 01:37)      Medications:  aspirin  chewable 81 milliGRAM(s) Oral daily  atorvastatin 80 milliGRAM(s) Oral at bedtime  benzocaine 15 mG/menthol 3.6 mG Lozenge 1 Lozenge Oral every 4 hours PRN  chlorhexidine 2% Cloths 1 Application(s) Topical at bedtime  famotidine    Tablet 20 milliGRAM(s) Oral daily PRN  heparin  Injectable 5000 Unit(s) SubCutaneous every 12 hours  melatonin 3 milliGRAM(s) Oral at bedtime PRN  metoprolol tartrate 12.5 milliGRAM(s) Oral every 12 hours  QUEtiapine 25 milliGRAM(s) Oral at bedtime  tamsulosin 0.4 milliGRAM(s) Oral at bedtime  ticagrelor 90 milliGRAM(s) Oral every 12 hours      Review of Systems:  Constitutional: [ ] Fever [ ] Chills [ ] Fatigue [ ] Weight change   HEENT: [ ] Blurred vision [ ] Eye Pain [ ] Headache [ ] Runny nose [ ] Sore Throat   Respiratory: [ ] Cough [ ] Wheezing [ ] Shortness of breath  Cardiovascular: [ ] Chest Pain [ ] Palpitations [ ] ARROYO [ ] PND [ ] Orthopnea  Gastrointestinal: [ ] Abdominal Pain [ ] Diarrhea [ ] Constipation [ ] Hemorrhoids [ ] Nausea [ ] Vomiting  Genitourinary: [ ] Nocturia [ ] Dysuria [ ] Incontinence  Extremities: [ ] Swelling [ ] Joint Pain  Neurologic: [ ] Focal deficit [ ] Paresthesias [ ] Syncope  Lymphatic: [ ] Swelling [ ] Lymphadenopathy   Skin: [ ] Rash [ ] Ecchymoses [ ] Wounds [ ] Lesions  Psychiatry: [ ] Depression [ ] Suicidal/Homicidal Ideation [ ] Anxiety [ ] Sleep Disturbances  [ ] 10 point review of systems is otherwise negative except as mentioned above            [ ]Unable to obtain    Vitals:  T(C): 36.7 (08-21-19 @ 04:23), Max: 38 (08-20-19 @ 20:00)  HR: 80 (08-21-19 @ 04:23) (79 - 101)  BP: 136/75 (08-21-19 @ 04:23) (136/71 - 166/79)  BP(mean): --  RR: 18 (08-21-19 @ 04:23) (18 - 18)  SpO2: 97% (08-21-19 @ 08:15) (96% - 97%)  Wt(kg): --  Daily     Daily   I&O's Summary    20 Aug 2019 07:01  -  21 Aug 2019 07:00  --------------------------------------------------------  IN: 1165 mL / OUT: 0 mL / NET: 1165 mL        Physical Exam:  General: NAD  Eye: PERRL, EOMI  HENT: Normal oral mucosa NC/AT  CV: Normal S1/S2, RRR, No M/R/G, no edema, no elevation in JVP, L groin site with no hematoma  Resp: Normal respiratory effort, clear to auscultation bilaterally, no wheezing, no crackles  Abd: Soft, Non-tender, Non-distended, BS+  Ext: No clubbing, No joint deformity   Neuro: Non-focal, motor and sensory intact  Lymph: No lymphadenopathy  Psych: AAOx1, Mood & affect appropriate  Skin: No rashes, No ecchymoses, No cyanosis    Labs:                        11.3   17.5  )-----------( 227      ( 21 Aug 2019 06:25 )             33.6     08-21    129<L>  |  95<L>  |  22  ----------------------------<  126<H>  3.6   |  23  |  1.22    Ca    8.7      21 Aug 2019 06:25  Mg     2.1     08-20                    New results/imaging:

## 2019-08-21 NOTE — PROGRESS NOTE ADULT - ASSESSMENT
A/P: 74 year old M pt with PMH of HTN, CVA, and GERD transferred from [facility] for NSTEMI.    - s/p LHC via L groin with DIAZ x1 to pLAD and D1  - groin site intact with no hematoma with no change from yesterday's exam  - ekg and telemetry reviewed, sinus rhythm  - c/w aspirin, brilinta, atorvastatin  - increase lopressor to 25mg bid  - start lisinopril 2.5mg daily    Oscar Fletcher, #[phone]  Cardiology Fellow

## 2019-08-21 NOTE — DIETITIAN INITIAL EVALUATION ADULT. - REASON INDICATOR FOR ASSESSMENT
Nutrition Assessment warranted for length of stay.   Information obtained from: electronic medical record, pt daughter at bedside (pt is A&Ox1, Japanese speaking, unable to participate in RD interview)

## 2019-08-21 NOTE — PROVIDER CONTACT NOTE (OTHER) - BACKGROUND
Pt admitted for STEMI. Pt has hx of CVA, GERD, Essential HTN. Pt has hx of PAT during this admission. Pt is on lopressor 12.5mg oral Q12H

## 2019-08-21 NOTE — PROGRESS NOTE ADULT - ASSESSMENT
74 year old Polish-speaking male with PMH of HTN, CVA with no residual neurologic deficits, and GERD presents from [facility] for an IWSTEMI, admitted for ACS protocol and ischemic work up, s/p LHC with 90% pLCX s/p DIAZ x1 for staged PCI.

## 2019-08-21 NOTE — DIETITIAN INITIAL EVALUATION ADULT. - RD TO REMAIN AVAILABLE
Nutrition education not indicated at this time in setting of poor po intake. Follow up with low sodium nutrition education as feasible to family members providing meals upon discharge. Pt daughter agrees to trial of oral nutrition supplement for pt at this time./yes

## 2019-08-22 LAB
ANION GAP SERPL CALC-SCNC: 11 MMOL/L — SIGNIFICANT CHANGE UP (ref 5–17)
APPEARANCE UR: CLEAR — SIGNIFICANT CHANGE UP
BILIRUB UR-MCNC: NEGATIVE — SIGNIFICANT CHANGE UP
BUN SERPL-MCNC: 22 MG/DL — SIGNIFICANT CHANGE UP (ref 7–23)
CALCIUM SERPL-MCNC: 8.3 MG/DL — LOW (ref 8.4–10.5)
CHLORIDE SERPL-SCNC: 95 MMOL/L — LOW (ref 96–108)
CK MB CFR SERPL CALC: 3.4 NG/ML — SIGNIFICANT CHANGE UP (ref 0–6.7)
CO2 SERPL-SCNC: 21 MMOL/L — LOW (ref 22–31)
COLOR SPEC: YELLOW — SIGNIFICANT CHANGE UP
CREAT SERPL-MCNC: 1.24 MG/DL — SIGNIFICANT CHANGE UP (ref 0.5–1.3)
CULTURE RESULTS: NO GROWTH — SIGNIFICANT CHANGE UP
DIFF PNL FLD: NEGATIVE — SIGNIFICANT CHANGE UP
GLUCOSE SERPL-MCNC: 103 MG/DL — HIGH (ref 70–99)
GLUCOSE UR QL: NEGATIVE — SIGNIFICANT CHANGE UP
HCT VFR BLD CALC: 31.3 % — LOW (ref 39–50)
HGB BLD-MCNC: 10.4 G/DL — LOW (ref 13–17)
KETONES UR-MCNC: NEGATIVE — SIGNIFICANT CHANGE UP
LEUKOCYTE ESTERASE UR-ACNC: ABNORMAL
MCHC RBC-ENTMCNC: 30.8 PG — SIGNIFICANT CHANGE UP (ref 27–34)
MCHC RBC-ENTMCNC: 33.2 GM/DL — SIGNIFICANT CHANGE UP (ref 32–36)
MCV RBC AUTO: 92.6 FL — SIGNIFICANT CHANGE UP (ref 80–100)
NITRITE UR-MCNC: NEGATIVE — SIGNIFICANT CHANGE UP
OSMOLALITY SERPL: 274 MOSMOL/KG — LOW (ref 280–301)
OSMOLALITY UR: 546 MOS/KG — SIGNIFICANT CHANGE UP (ref 300–900)
PH UR: 6 — SIGNIFICANT CHANGE UP (ref 5–8)
PLATELET # BLD AUTO: 242 K/UL — SIGNIFICANT CHANGE UP (ref 150–400)
POTASSIUM SERPL-MCNC: 3.8 MMOL/L — SIGNIFICANT CHANGE UP (ref 3.5–5.3)
POTASSIUM SERPL-SCNC: 3.8 MMOL/L — SIGNIFICANT CHANGE UP (ref 3.5–5.3)
PROT UR-MCNC: SIGNIFICANT CHANGE UP
RBC # BLD: 3.38 M/UL — LOW (ref 4.2–5.8)
RBC # FLD: 12.2 % — SIGNIFICANT CHANGE UP (ref 10.3–14.5)
SODIUM SERPL-SCNC: 127 MMOL/L — LOW (ref 135–145)
SP GR SPEC: 1.01 — SIGNIFICANT CHANGE UP (ref 1.01–1.02)
SPECIMEN SOURCE: SIGNIFICANT CHANGE UP
TROPONIN T, HIGH SENSITIVITY RESULT: 178 NG/L — HIGH (ref 0–51)
TROPONIN T, HIGH SENSITIVITY RESULT: 179 NG/L — HIGH (ref 0–51)
URATE SERPL-MCNC: 6.3 MG/DL — SIGNIFICANT CHANGE UP (ref 3.4–8.8)
UROBILINOGEN FLD QL: ABNORMAL
WBC # BLD: 14.4 K/UL — HIGH (ref 3.8–10.5)
WBC # FLD AUTO: 14.4 K/UL — HIGH (ref 3.8–10.5)

## 2019-08-22 PROCEDURE — 99232 SBSQ HOSP IP/OBS MODERATE 35: CPT

## 2019-08-22 RX ORDER — METOCLOPRAMIDE HCL 10 MG
5 TABLET ORAL ONCE
Refills: 0 | Status: COMPLETED | OUTPATIENT
Start: 2019-08-22 | End: 2019-08-22

## 2019-08-22 RX ADMIN — TAMSULOSIN HYDROCHLORIDE 0.4 MILLIGRAM(S): 0.4 CAPSULE ORAL at 19:57

## 2019-08-22 RX ADMIN — HEPARIN SODIUM 5000 UNIT(S): 5000 INJECTION INTRAVENOUS; SUBCUTANEOUS at 18:22

## 2019-08-22 RX ADMIN — PANTOPRAZOLE SODIUM 40 MILLIGRAM(S): 20 TABLET, DELAYED RELEASE ORAL at 06:57

## 2019-08-22 RX ADMIN — Medication 3 MILLIGRAM(S): at 19:57

## 2019-08-22 RX ADMIN — Medication 25 MILLIGRAM(S): at 18:22

## 2019-08-22 RX ADMIN — Medication 81 MILLIGRAM(S): at 11:47

## 2019-08-22 RX ADMIN — Medication 5 MILLIGRAM(S): at 12:58

## 2019-08-22 RX ADMIN — TICAGRELOR 90 MILLIGRAM(S): 90 TABLET ORAL at 18:22

## 2019-08-22 RX ADMIN — LISINOPRIL 2.5 MILLIGRAM(S): 2.5 TABLET ORAL at 06:57

## 2019-08-22 RX ADMIN — FAMOTIDINE 20 MILLIGRAM(S): 10 INJECTION INTRAVENOUS at 11:46

## 2019-08-22 RX ADMIN — ATORVASTATIN CALCIUM 80 MILLIGRAM(S): 80 TABLET, FILM COATED ORAL at 19:57

## 2019-08-22 RX ADMIN — QUETIAPINE FUMARATE 25 MILLIGRAM(S): 200 TABLET, FILM COATED ORAL at 19:57

## 2019-08-22 RX ADMIN — BENZOCAINE AND MENTHOL 1 LOZENGE: 5; 1 LIQUID ORAL at 11:48

## 2019-08-22 RX ADMIN — TICAGRELOR 90 MILLIGRAM(S): 90 TABLET ORAL at 06:58

## 2019-08-22 RX ADMIN — Medication 25 MILLIGRAM(S): at 06:57

## 2019-08-22 RX ADMIN — HEPARIN SODIUM 5000 UNIT(S): 5000 INJECTION INTRAVENOUS; SUBCUTANEOUS at 06:57

## 2019-08-22 NOTE — CHART NOTE - NSCHARTNOTEFT_GEN_A_CORE
Interval events    Trop trending up 141->161->175  patient with no c/o CP overnight, had CP during day  Gi complaints of ags overnight, simethicone and Protonix received.    Vital Signs Last 24 Hrs  T(C): 37.3 (22 Aug 2019 06:46), Max: 37.7 (21 Aug 2019 11:09)  T(F): 99.1 (22 Aug 2019 06:46), Max: 99.9 (21 Aug 2019 11:09)  HR: 85 (22 Aug 2019 06:46) (76 - 98)  BP: 122/70 (22 Aug 2019 06:46) (119/71 - 164/71)  BP(mean): --  RR: 18 (22 Aug 2019 06:46) (17 - 18)  SpO2: 95% (22 Aug 2019 06:46) (95% - 97%)    74 year old Mohawk-speaking male with PMH of HTN, CVA with no residual neurologic deficits, and GERD presents from [facility] for an IWSTEMI, admitted for ACS protocol and ischemic work up, s/p LHC with 90% pLCX s/p DIAZ x1 for staged PCI.  CP during day  No Cp overnight  Discussed with April fellow  Continue to trend trop per him  Call cardiology if pt develops CP  Will follow    Lucia Duarte P BC  [id number]

## 2019-08-22 NOTE — PROGRESS NOTE ADULT - ASSESSMENT
74 year old Vietnamese-speaking male with PMH of HTN, CVA with no residual neurologic deficits, and GERD presents from [facility] for an IWSTEMI, admitted for ACS protocol and ischemic work up, s/p LHC with 90% pLCX s/p DIAZ x1 for staged PCI.

## 2019-08-22 NOTE — PROGRESS NOTE ADULT - SUBJECTIVE AND OBJECTIVE BOX
Mohsin Khan, MD  Attending Physician, Division Of Hospital Medicine  Pager: [phone], Office: [phone]      Patient is a 74y old  Male who presents with a chief complaint of IWSTEMI    SUBJECTIVE / OVERNIGHT EVENTS:  Seen, examined the patient in am, daughter at bedside  Resting in bed, no chest pain, SOB, AA x 2, afebrile, has been having low appetite  Vitals are stable, Tele- SR controlled rate        MEDICATIONS  (STANDING):  aspirin  chewable 81 milliGRAM(s) Oral daily  atorvastatin 80 milliGRAM(s) Oral at bedtime  heparin  Injectable 5000 Unit(s) SubCutaneous every 12 hours  lisinopril 2.5 milliGRAM(s) Oral daily  metoprolol tartrate 25 milliGRAM(s) Oral two times a day  pantoprazole    Tablet 40 milliGRAM(s) Oral before breakfast  QUEtiapine 25 milliGRAM(s) Oral at bedtime  tamsulosin 0.4 milliGRAM(s) Oral at bedtime  ticagrelor 90 milliGRAM(s) Oral every 12 hours    MEDICATIONS  (PRN):  benzocaine 15 mG/menthol 3.6 mG Lozenge 1 Lozenge Oral every 4 hours PRN Sore Throat  famotidine    Tablet 20 milliGRAM(s) Oral daily PRN reflex, indigestion  melatonin 3 milliGRAM(s) Oral at bedtime PRN Insomnia      Vital Signs Last 24 Hrs  T(C): 37.8 (22 Aug 2019 15:00), Max: 37.8 (22 Aug 2019 15:00)  T(F): 100.1 (22 Aug 2019 15:00), Max: 100.1 (22 Aug 2019 15:00)  HR: 85 (22 Aug 2019 15:00) (83 - 88)  BP: 127/70 (22 Aug 2019 15:00) (121/68 - 164/71)  BP(mean): --  RR: 17 (22 Aug 2019 15:00) (17 - 18)  SpO2: 98% (22 Aug 2019 15:00) (95% - 98%)  CAPILLARY BLOOD GLUCOSE        I&O's Summary    21 Aug 2019 07:01  -  22 Aug 2019 07:00  --------------------------------------------------------  IN: 1735 mL / OUT: 1475 mL / NET: 260 mL        PHYSICAL EXAM:-  GENERAL: NAD, well-developed  EYES: EOMI, PERRLA, conjunctiva and sclera clear  NECK: Supple, No JVD, no thyromegaly  CHEST/LUNG: Clear to auscultation bilaterally; No wheeze  HEART: Regular rate and rhythm; S1, S2 audible, No murmurs, rubs, or gallops  ABDOMEN: Soft, Nontender, Nondistended; Bowel sounds present  EXTREMITIES:  2+ Peripheral Pulses, No clubbing, cyanosis, or edema  NEURO: AAOx2, no focal deficit      LABS:                        10.4   14.4  )-----------( 242      ( 22 Aug 2019 06:55 )             31.3     08-22    127<L>  |  95<L>  |  22  ----------------------------<  103<H>  3.8   |  21<L>  |  1.24    Ca    8.3<L>      22 Aug 2019 06:55        CARDIAC MARKERS ( 21 Aug 2019 23:49 )  x     / x     / x     / x     / 3.4 ng/mL  CARDIAC MARKERS ( 21 Aug 2019 16:34 )  x     / x     / x     / x     / 3.5 ng/mL  CARDIAC MARKERS ( 21 Aug 2019 11:48 )  x     / x     / x     / x     / 3.3 ng/mL      Urinalysis Basic - ( 21 Aug 2019 17:33 )    Color: Yellow / Appearance: Clear / S.015 / pH: x  Gluc: x / Ketone: Negative  / Bili: Negative / Urobili: 12 mg/dL   Blood: x / Protein: Trace / Nitrite: Negative   Leuk Esterase: Small / RBC: 2 /HPF / WBC 2 /HPF   Sq Epi: x / Non Sq Epi: 2 /HPF / Bacteria: Negative        RADIOLOGY & ADDITIONAL TESTS:    Imaging Personally Reviewed: CXR, Cath  Consultant(s) Notes Reviewed: Cardiologist  Care Discussed with Consultants/Other Providers: Cardiology

## 2019-08-23 ENCOUNTER — TRANSCRIPTION ENCOUNTER (OUTPATIENT)
Age: 74
End: 2019-08-23

## 2019-08-23 DIAGNOSIS — R06.6 HICCOUGH: ICD-10-CM

## 2019-08-23 LAB
ANION GAP SERPL CALC-SCNC: 13 MMOL/L — SIGNIFICANT CHANGE UP (ref 5–17)
BUN SERPL-MCNC: 24 MG/DL — HIGH (ref 7–23)
CALCIUM SERPL-MCNC: 8.5 MG/DL — SIGNIFICANT CHANGE UP (ref 8.4–10.5)
CHLORIDE SERPL-SCNC: 95 MMOL/L — LOW (ref 96–108)
CO2 SERPL-SCNC: 21 MMOL/L — LOW (ref 22–31)
CREAT SERPL-MCNC: 1.3 MG/DL — SIGNIFICANT CHANGE UP (ref 0.5–1.3)
GLUCOSE SERPL-MCNC: 130 MG/DL — HIGH (ref 70–99)
HCT VFR BLD CALC: 30.1 % — LOW (ref 39–50)
HGB BLD-MCNC: 10.2 G/DL — LOW (ref 13–17)
MCHC RBC-ENTMCNC: 30.8 PG — SIGNIFICANT CHANGE UP (ref 27–34)
MCHC RBC-ENTMCNC: 33.9 GM/DL — SIGNIFICANT CHANGE UP (ref 32–36)
MCV RBC AUTO: 90.9 FL — SIGNIFICANT CHANGE UP (ref 80–100)
PLATELET # BLD AUTO: 276 K/UL — SIGNIFICANT CHANGE UP (ref 150–400)
POTASSIUM SERPL-MCNC: 3.6 MMOL/L — SIGNIFICANT CHANGE UP (ref 3.5–5.3)
POTASSIUM SERPL-SCNC: 3.6 MMOL/L — SIGNIFICANT CHANGE UP (ref 3.5–5.3)
RBC # BLD: 3.31 M/UL — LOW (ref 4.2–5.8)
RBC # FLD: 12.6 % — SIGNIFICANT CHANGE UP (ref 10.3–14.5)
SODIUM SERPL-SCNC: 129 MMOL/L — LOW (ref 135–145)
TROPONIN T, HIGH SENSITIVITY RESULT: 210 NG/L — HIGH (ref 0–51)
WBC # BLD: 13.05 K/UL — HIGH (ref 3.8–10.5)
WBC # FLD AUTO: 13.05 K/UL — HIGH (ref 3.8–10.5)

## 2019-08-23 PROCEDURE — 99223 1ST HOSP IP/OBS HIGH 75: CPT | Mod: GC

## 2019-08-23 PROCEDURE — 99232 SBSQ HOSP IP/OBS MODERATE 35: CPT

## 2019-08-23 RX ORDER — SODIUM CHLORIDE 9 MG/ML
1 INJECTION INTRAMUSCULAR; INTRAVENOUS; SUBCUTANEOUS EVERY 8 HOURS
Refills: 0 | Status: COMPLETED | OUTPATIENT
Start: 2019-08-23 | End: 2019-08-26

## 2019-08-23 RX ORDER — BACLOFEN 100 %
5 POWDER (GRAM) MISCELLANEOUS THREE TIMES A DAY
Refills: 0 | Status: DISCONTINUED | OUTPATIENT
Start: 2019-08-23 | End: 2019-08-25

## 2019-08-23 RX ORDER — METOCLOPRAMIDE HCL 10 MG
5 TABLET ORAL ONCE
Refills: 0 | Status: COMPLETED | OUTPATIENT
Start: 2019-08-23 | End: 2019-08-23

## 2019-08-23 RX ADMIN — TAMSULOSIN HYDROCHLORIDE 0.4 MILLIGRAM(S): 0.4 CAPSULE ORAL at 21:07

## 2019-08-23 RX ADMIN — SODIUM CHLORIDE 1 GRAM(S): 9 INJECTION INTRAMUSCULAR; INTRAVENOUS; SUBCUTANEOUS at 13:36

## 2019-08-23 RX ADMIN — LISINOPRIL 2.5 MILLIGRAM(S): 2.5 TABLET ORAL at 05:19

## 2019-08-23 RX ADMIN — TICAGRELOR 90 MILLIGRAM(S): 90 TABLET ORAL at 17:21

## 2019-08-23 RX ADMIN — Medication 3 MILLIGRAM(S): at 23:41

## 2019-08-23 RX ADMIN — QUETIAPINE FUMARATE 25 MILLIGRAM(S): 200 TABLET, FILM COATED ORAL at 21:07

## 2019-08-23 RX ADMIN — ATORVASTATIN CALCIUM 80 MILLIGRAM(S): 80 TABLET, FILM COATED ORAL at 21:07

## 2019-08-23 RX ADMIN — Medication 5 MILLIGRAM(S): at 17:21

## 2019-08-23 RX ADMIN — HEPARIN SODIUM 5000 UNIT(S): 5000 INJECTION INTRAVENOUS; SUBCUTANEOUS at 05:19

## 2019-08-23 RX ADMIN — Medication 5 MILLIGRAM(S): at 13:36

## 2019-08-23 RX ADMIN — Medication 81 MILLIGRAM(S): at 13:36

## 2019-08-23 RX ADMIN — Medication 25 MILLIGRAM(S): at 17:21

## 2019-08-23 RX ADMIN — HEPARIN SODIUM 5000 UNIT(S): 5000 INJECTION INTRAVENOUS; SUBCUTANEOUS at 17:21

## 2019-08-23 RX ADMIN — PANTOPRAZOLE SODIUM 40 MILLIGRAM(S): 20 TABLET, DELAYED RELEASE ORAL at 05:19

## 2019-08-23 RX ADMIN — SODIUM CHLORIDE 1 GRAM(S): 9 INJECTION INTRAMUSCULAR; INTRAVENOUS; SUBCUTANEOUS at 21:07

## 2019-08-23 RX ADMIN — TICAGRELOR 90 MILLIGRAM(S): 90 TABLET ORAL at 05:19

## 2019-08-23 RX ADMIN — Medication 25 MILLIGRAM(S): at 05:19

## 2019-08-23 NOTE — PROGRESS NOTE ADULT - SUBJECTIVE AND OBJECTIVE BOX
Mohsin Khan, MD  Attending Physician, Division Of Hospital Medicine  Pager: [phone], Office: [phone]  Off hour pager: [phone]    Patient is a 74y old  Male who presents with a chief complaint of IWSTEMI    SUBJECTIVE / OVERNIGHT EVENTS:  Resting in bed, no c/o chest pain, SOB, no N/V, sweats of fever. Has been c/o persistent Hiccups for 3 days  Vitals- 149/84, O2 98%      MEDICATIONS  (STANDING):  aspirin  chewable 81 milliGRAM(s) Oral daily  atorvastatin 80 milliGRAM(s) Oral at bedtime  heparin  Injectable 5000 Unit(s) SubCutaneous every 12 hours  lisinopril 2.5 milliGRAM(s) Oral daily  metoprolol tartrate 25 milliGRAM(s) Oral two times a day  pantoprazole    Tablet 40 milliGRAM(s) Oral before breakfast  QUEtiapine 25 milliGRAM(s) Oral at bedtime  sodium chloride 1 Gram(s) Oral every 8 hours  tamsulosin 0.4 milliGRAM(s) Oral at bedtime  ticagrelor 90 milliGRAM(s) Oral every 12 hours    MEDICATIONS  (PRN):  benzocaine 15 mG/menthol 3.6 mG Lozenge 1 Lozenge Oral every 4 hours PRN Sore Throat  famotidine    Tablet 20 milliGRAM(s) Oral daily PRN reflex, indigestion  melatonin 3 milliGRAM(s) Oral at bedtime PRN Insomnia      Vital Signs Last 24 Hrs  T(C): 36.6 (23 Aug 2019 11:50), Max: 37.4 (22 Aug 2019 20:22)  T(F): 97.9 (23 Aug 2019 11:50), Max: 99.4 (22 Aug 2019 20:22)  HR: 92 (23 Aug 2019 11:50) (73 - 100)  BP: 149/84 (23 Aug 2019 11:50) (104/63 - 149/84)  BP(mean): --  RR: 18 (23 Aug 2019 11:50) (17 - 18)  SpO2: 98% (23 Aug 2019 11:50) (97% - 98%)  CAPILLARY BLOOD GLUCOSE        I&O's Summary    22 Aug 2019 07:01  -  23 Aug 2019 07:00  --------------------------------------------------------  IN: 200 mL / OUT: 720 mL / NET: -520 mL    23 Aug 2019 07:01  -  23 Aug 2019 15:30  --------------------------------------------------------  IN: 550 mL / OUT: 1 mL / NET: 549 mL        PHYSICAL EXAM:-  GENERAL: NAD, well-developed  EYES: EOMI, PERRLA, conjunctiva and sclera clear  NECK: Supple, No JVD, no thyromegaly  CHEST/LUNG: Clear to auscultation bilaterally; No wheeze  HEART: Regular rate and rhythm; S1, S2 audible, No murmurs, rubs, or gallops  ABDOMEN: Soft, Nontender, Nondistended; Bowel sounds present  EXTREMITIES:  2+ Peripheral Pulses, No clubbing, cyanosis, or edema  NEURO: AAOx3, no focal deficit      LABS:                        10.2   13.05 )-----------( 276      ( 23 Aug 2019 09:44 )             30.1     08-23    129<L>  |  95<L>  |  24<H>  ----------------------------<  130<H>  3.6   |  21<L>  |  1.30    Ca    8.5      23 Aug 2019 07:16        CARDIAC MARKERS ( 21 Aug 2019 23:49 )  x     / x     / x     / x     / 3.4 ng/mL  CARDIAC MARKERS ( 21 Aug 2019 16:34 )  x     / x     / x     / x     / 3.5 ng/mL      Urinalysis Basic - ( 21 Aug 2019 17:33 )    Color: Yellow / Appearance: Clear / S.015 / pH: x  Gluc: x / Ketone: Negative  / Bili: Negative / Urobili: 12 mg/dL   Blood: x / Protein: Trace / Nitrite: Negative   Leuk Esterase: Small / RBC: 2 /HPF / WBC 2 /HPF   Sq Epi: x / Non Sq Epi: 2 /HPF / Bacteria: Negative        RADIOLOGY & ADDITIONAL TESTS:    Imaging Personally Reviewed: Echo, Cath  Consultant(s) Notes Reviewed: Card  Care Discussed with Consultants/Other Providers: card

## 2019-08-23 NOTE — DISCHARGE NOTE PROVIDER - CARE PROVIDER_API CALL
Sam Penny)  Cardiology; Internal Medicine; Interventional Cardiology  [facility] Dept of Cardiology, [address] [place] [address]  Phone: [phone]  Fax: [phone]  Follow Up Time: 2 weeks

## 2019-08-23 NOTE — DISCHARGE NOTE PROVIDER - HOSPITAL COURSE
74 year old Azeri-speaking male with PMH of HTN, CVA with no residual neurologic deficits, and GERD presents from [facility] for an IWSTEMI, admitted for ACS protocol and ischemic work up, s/p C with 90% pLCX s/p DIAZ x1 for staged PCI.             Problem/Plan - 1:    ·  Problem: ST elevation myocardial infarction (STEMI) involving other coronary artery of inferior wall.  Plan: No c/o chest pain, SOB. Has been having hiccups. TTE with hyperdynamic EF, no LV thrombus, no WMA. s/p cath 8/15 with DIAZ to pLCX, S/P staged PCI to ramus and LAD     - Troponin is elevated since PCI. Now trop 220. Cardiology re-evaluated, Wants to trend    - c/w DAPT, statin, metoprolol and Lisinopril         Problem/Plan - 2:    ·  Problem: Hiccups.  Plan: Unclear, has no h/ Pulmonary disease, Likely from ACS and hyponatremia. Trial with Reglan , PPI didn't help/ Spoke to house GI. They recommended Baclofen or Ativan prn    - Baclofen 5mg tid added. Might need Ativan at low dose.          Problem/Plan - 3:    ·  Problem: Fever, unspecified fever cause.  Plan: Fever 100.4 2-3 days ago. Leukocytosis without localizing symptoms.     - Cultures drawn and pending results    - CXR, doppler ext negative.          Problem/Plan - 4:    ·  Problem: Hyponatremia.  Plan: Na 129. Osmolalities seems reflects SIADH although uric acid level normal.    - Fluid restriction 1L/d, salt tabs. 74 year old Turkish-speaking male with PMH of HTN, CVA with no residual neurologic deficits, and GERD presents from [facility] for an IWSTEMI, admitted for ACS protocol and ischemic work up, s/p C with 90% pLCX s/p DIAZ x1 for staged PCI.             Problem/Plan - 1:    ·  Problem: ST elevation myocardial infarction (STEMI) involving other coronary artery of inferior wall.  Plan: No c/o chest pain, SOB. Has been having hiccups. TTE with hyperdynamic EF, no LV thrombus, no WMA. s/p cath 8/15 with DIAZ to pLCX, S/P staged PCI to ramus and LAD     - Troponin is elevated 2/2 cath, no further chest pain and no EKG changes    - c/w DAPT, statin, metoprolol and Lisinopril         Problem/Plan - 2:    ·  Problem: Hiccups.  Plan: Unclear, has no h/ Pulmonary disease, Likely from ACS and hyponatremia. Trial with Reglan , PPI didn't help/ Spoke to house GI. They recommended Baclofen or Ativan prn    - Baclofen 5mg tid added. Might need Ativan at low dose.          Problem/Plan - 3:    ·  Problem: Fever, unspecified fever cause.  Plan: Fever 100.4 2-3 days ago. Leukocytosis without localizing symptoms.     - Urine Cx and blood cx negative    - CXR, doppler ext negative.          Problem/Plan - 4:    ·  Problem: Hyponatremia.  Plan: Na 129. Osmolalities seems reflects SIADH although uric acid level normal.    - Fluid restriction 1L/d, salt tabs.     -Resolving, Na up to 132 74 year old Vietnamese-speaking male with PMH of HTN, CVA with no residual neurologic deficits, and GERD presents from [facility] for an IWSTEMI, admitted for ACS protocol and ischemic work up, s/p C with 90% pLCX s/p DIAZ x1 and Staged PCI c/b TERRY, hyponatremia.                      Problem/Plan - 1:    ·  Problem: ST elevation myocardial infarction (STEMI) involving other coronary artery of inferior wall.  Plan: -s/p cath 8/15 with DIAZ to pLCX, S/P staged PCI to ramus and LAD     -TTE with hyperdynamic EF, no LV thrombus, no WMA.    - Trops post cath with GI symptoms likely post cath per cards, trops downtrended, no cp,    - c/w DAPT (enforced compliance with family, statin)    - change metoprolol to toprol 50mg ER daily and c/w Lisinopril 2.5     spoke to Daughter over the phone.          Problem/Plan - 2:    ·  Problem: Hiccups.  Plan: resolved likely related to GERD    -no need for further baclofen    -c/w ppi, h2 blocker prn.          Problem/Plan - 3:    ·  Problem: GERD (gastroesophageal reflux disease).          Problem/Plan - 4:    ·  Problem: Fever, unspecified fever cause.  Plan: resolved, afebrile, had low grade fever prior    -mild leukocytosis but no s/s of infection    - Blood and Urine Cx negative     - CXR, doppler LE negative.          Problem/Plan - 5:    ·  Problem: TERRY (acute kidney injury).  Plan: resolved likely due to contrast from cath and poor po intake, improved with IVF,     -creatinine back to baseline.          Problem/Plan - 6:    Problem: Hyponatremia. Plan: improving to 132 today from marcelo of 127 likely due to poor po intake, asymptomatic     - encourage po intake    - outpatient follwup.         Problem/Plan - 7:    ·  Problem: Cerebrovascular accident (CVA).  Plan: hx of CVA,     - c/w antiplatelet and statin as above.             Problem/Plan - 8:    ·  Problem: Need for prophylactic measure.  Plan: bph: c/w flomax    dvt ppx: HSQ    Dispo: discharge home today with outpatient cards and PCP followup with 1-2 weeks    spent 45 min on discharge process time.            Attending Attestation:     Plan discussed with NP Mirtha, daughter, son in law bedside.

## 2019-08-23 NOTE — DISCHARGE NOTE PROVIDER - NSDCCPCAREPLAN_GEN_ALL_CORE_FT
PRINCIPAL DISCHARGE DIAGNOSIS  Diagnosis: ST elevation MI (STEMI)  Assessment and Plan of Treatment: Call your doctor if you have unusual chest pain, pressure, or discomfort, shortness of breath, nausea, vomiting, burping, heartburn, tingling upper body parts, sweating, cold, clammy sking, racing heartbeat  Call 911 if you think you are having a heart attack  Take all cardiac medications as prescribed - notify your doctor if you have any side effects  Follow cardiac diet - avoid fatty & fried foods, don't eat too much red meat, eat lots of fruits & vegetables, dairy products should be low fat  Lose weight if you are overweight  Become more active with walking, gardening, or any other activity that gets you to move        SECONDARY DISCHARGE DIAGNOSES  Diagnosis: Hyponatremia  Assessment and Plan of Treatment: C/w salt tabs

## 2019-08-23 NOTE — PROGRESS NOTE ADULT - SUBJECTIVE AND OBJECTIVE BOX
Patient seen and examined at bedside.    Overnight Events: Pt having a great deal of nausea. Also agitated trying to get out of bed.       REVIEW OF SYSTEMS:  Constitutional:     [ ] negative [ ] fevers [ ] chills [ ] weight loss [ ] weight gain  HEENT:                  [ ] negative [ ] dry eyes [ ] eye irritation [ ] postnasal drip [ ] nasal congestion  CV:                         [ ] negative  [ ] chest pain [ ] orthopnea [ ] palpitations [ ] murmur  Resp:                     [ ] negative [ ] cough [ ] shortness of breath [ ] dyspnea [ ] wheezing [ ] sputum [ ]hemoptysis  GI:                          [ ] negative [ x] nausea [x ] vomiting [ ] diarrhea [ ] constipation [ ] abd pain [ ] dysphagia   :                        [ ] negative [ ] dysuria [ ] nocturia [ ] hematuria [ ] increased urinary frequency  Musculoskeletal: [ ] negative [ ] back pain [ ] myalgias [ ] arthralgias [ ] fracture  Skin:                       [ ] negative [ ] rash [ ] itch  Neurological:        [ ] negative [ ] headache [ ] dizziness [ ] syncope [ ] weakness [ ] numbness  Psychiatric:           [ ] negative [ ] anxiety [ ] depression  Endocrine:            [ ] negative [ ] diabetes [ ] thyroid problem  Heme/Lymph:      [ ] negative [ ] anemia [ ] bleeding problem  Allergic/Immune: [ ] negative [ ] itchy eyes [ ] nasal discharge [ ] hives [ ] angioedema    [ x] All other systems negative  [ ] Unable to assess ROS due to    Current Meds:  aspirin  chewable 81 milliGRAM(s) Oral daily  atorvastatin 80 milliGRAM(s) Oral at bedtime  benzocaine 15 mG/menthol 3.6 mG Lozenge 1 Lozenge Oral every 4 hours PRN  famotidine    Tablet 20 milliGRAM(s) Oral daily PRN  heparin  Injectable 5000 Unit(s) SubCutaneous every 12 hours  lisinopril 2.5 milliGRAM(s) Oral daily  melatonin 3 milliGRAM(s) Oral at bedtime PRN  metoprolol tartrate 25 milliGRAM(s) Oral two times a day  pantoprazole    Tablet 40 milliGRAM(s) Oral before breakfast  QUEtiapine 25 milliGRAM(s) Oral at bedtime  sodium chloride 1 Gram(s) Oral every 8 hours  tamsulosin 0.4 milliGRAM(s) Oral at bedtime  ticagrelor 90 milliGRAM(s) Oral every 12 hours      PAST MEDICAL & SURGICAL HISTORY:  Cerebrovascular accident (CVA)  GERD (gastroesophageal reflux disease)  Essential hypertension  No significant past surgical history      Vitals:  T(F): 97.9 (08-23), Max: 100.1 (08-22)  HR: 92 (08-23) (73 - 100)  BP: 149/84 (08-23) (104/63 - 149/84)  RR: 18 (08-23)  SpO2: 98% (08-23)  I&O's Summary    22 Aug 2019 07:01  -  23 Aug 2019 07:00  --------------------------------------------------------  IN: 200 mL / OUT: 720 mL / NET: -520 mL    23 Aug 2019 07:01  -  23 Aug 2019 14:16  --------------------------------------------------------  IN: 550 mL / OUT: 0 mL / NET: 550 mL        Physical Exam:  Appearance: No acute distress; well appearing  Eyes: PERRL, EOMI, pink conjunctiva  HENT: Normal oral mucosa  Cardiovascular: RRR, S1, S2, no murmurs, rubs, or gallops; no edema; no JVD  Respiratory: Clear to auscultation bilaterally  Gastrointestinal: soft, non-tender, non-distended with normal bowel sounds  Musculoskeletal: No clubbing; no joint deformity   Neurologic: Non-focal  Lymphatic: No lymphadenopathy  Psychiatry: AAOx3, mood & affect appropriate  Skin: No rashes, ecchymoses, or cyanosis                          10.2   13.05 )-----------( 276      ( 23 Aug 2019 09:44 )             30.1     08-23    129<L>  |  95<L>  |  24<H>  ----------------------------<  130<H>  3.6   |  21<L>  |  1.30    Ca    8.5      23 Aug 2019 07:16        CARDIAC MARKERS ( 21 Aug 2019 23:49 )  x     / x     / x     / x     / 3.4 ng/mL  CARDIAC MARKERS ( 21 Aug 2019 16:34 )  x     / x     / x     / x     / 3.5 ng/mL              New ECG(s): Personally reviewed    Echo:    Stress Testing:     Cath:  < from: Cardiac Cath Lab - Adult (08.15.19 @ 02:01) >  LM:   --  LM: Normal.  LAD:   --  Proximal LAD: There was a 90 % stenosis.  --  D1: There was a 90 % stenosis.  CX:   --  Proximal circumflex: There was a 90 % stenosis.  RCA:   --  Proximal RCA: There was a 30 % stenosis.  COMPLICATIONS: There were no complications.  INTERVENTIONAL RECOMMENDATIONS: DAPT for 1 year  Prepared and signed by  Jevon Gill M.D.    < end of copied text >    < from: Cardiac Cath Lab - Adult (08.19.19 @ 09:51) >  LAD:   --  Proximal LAD: There was a 90 % stenosis.  --  D1: There was a 90 % stenosis.  COMPLICATIONS: There were no complications.  INTERVENTIONAL RECOMMENDATIONS: DAPT for 1 year  Prepared and signed by  Jevon Gill M.D.    < end of copied text >      Interpretation of Telemetry: Sinus, Sinus tach

## 2019-08-23 NOTE — PROGRESS NOTE ADULT - ASSESSMENT
74 year old Welsh-speaking male with PMH of HTN, CVA with no residual neurologic deficits, and GERD.  Presents from [facility]. for concern for IWSTEMI.  Admitted for ACS protocol and ischemic work up, now s/p LHC with 90% pLCX s/p DIAZ x1. Cardiac enzymes negative.     REC:  #1. Chest pain likely 2/2 CAD w/ ECG changes but w/o IWSTEMI. Negative trop initially   - Pt now s/p full revascularization.   - having a great deal of gastrointestinal complaints.   - He does have a trop elevated, but there is not CKMB elevation.  - His trop elevation is likely in the setting of recent intervention. ECG is not ischemic.   - Continue to trend till it down trends.   - c/w ASA and Brilinta.  - C/w lisinopril and metoprolol.      Amadou Xie MD  Cardiology Fellow - PGY 5  Text or Call: [phone]  For all New Consults and Questions:  [email or web address]   Login: ba

## 2019-08-23 NOTE — PROGRESS NOTE ADULT - ASSESSMENT
74 year old Pashto-speaking male with PMH of HTN, CVA with no residual neurologic deficits, and GERD presents from [facility] for an IWSTEMI, admitted for ACS protocol and ischemic work up, s/p LHC with 90% pLCX s/p DIAZ x1 for staged PCI.

## 2019-08-24 DIAGNOSIS — F01.50 VASCULAR DEMENTIA WITHOUT BEHAVIORAL DISTURBANCE: ICD-10-CM

## 2019-08-24 DIAGNOSIS — F05 DELIRIUM DUE TO KNOWN PHYSIOLOGICAL CONDITION: ICD-10-CM

## 2019-08-24 LAB
ANION GAP SERPL CALC-SCNC: 14 MMOL/L — SIGNIFICANT CHANGE UP (ref 5–17)
ANION GAP SERPL CALC-SCNC: 14 MMOL/L — SIGNIFICANT CHANGE UP (ref 5–17)
BUN SERPL-MCNC: 26 MG/DL — HIGH (ref 7–23)
BUN SERPL-MCNC: 26 MG/DL — HIGH (ref 7–23)
CALCIUM SERPL-MCNC: 9.4 MG/DL — SIGNIFICANT CHANGE UP (ref 8.4–10.5)
CALCIUM SERPL-MCNC: 9.5 MG/DL — SIGNIFICANT CHANGE UP (ref 8.4–10.5)
CHLORIDE SERPL-SCNC: 90 MMOL/L — LOW (ref 96–108)
CHLORIDE SERPL-SCNC: 94 MMOL/L — LOW (ref 96–108)
CK MB BLD-MCNC: 2.6 % — SIGNIFICANT CHANGE UP (ref 0–3.5)
CK MB CFR SERPL CALC: 2.9 NG/ML — SIGNIFICANT CHANGE UP (ref 0–6.7)
CK SERPL-CCNC: 110 U/L — SIGNIFICANT CHANGE UP (ref 30–200)
CO2 SERPL-SCNC: 21 MMOL/L — LOW (ref 22–31)
CO2 SERPL-SCNC: 23 MMOL/L — SIGNIFICANT CHANGE UP (ref 22–31)
CREAT SERPL-MCNC: 1.39 MG/DL — HIGH (ref 0.5–1.3)
CREAT SERPL-MCNC: 1.67 MG/DL — HIGH (ref 0.5–1.3)
FOLATE SERPL-MCNC: 9.6 NG/ML — SIGNIFICANT CHANGE UP
GLUCOSE SERPL-MCNC: 138 MG/DL — HIGH (ref 70–99)
GLUCOSE SERPL-MCNC: 183 MG/DL — HIGH (ref 70–99)
HCT VFR BLD CALC: 33.3 % — LOW (ref 39–50)
HGB BLD-MCNC: 11.2 G/DL — LOW (ref 13–17)
MCHC RBC-ENTMCNC: 30.4 PG — SIGNIFICANT CHANGE UP (ref 27–34)
MCHC RBC-ENTMCNC: 33.6 GM/DL — SIGNIFICANT CHANGE UP (ref 32–36)
MCV RBC AUTO: 90.5 FL — SIGNIFICANT CHANGE UP (ref 80–100)
PLATELET # BLD AUTO: 311 K/UL — SIGNIFICANT CHANGE UP (ref 150–400)
POTASSIUM SERPL-MCNC: 3.6 MMOL/L — SIGNIFICANT CHANGE UP (ref 3.5–5.3)
POTASSIUM SERPL-MCNC: 4.3 MMOL/L — SIGNIFICANT CHANGE UP (ref 3.5–5.3)
POTASSIUM SERPL-SCNC: 3.6 MMOL/L — SIGNIFICANT CHANGE UP (ref 3.5–5.3)
POTASSIUM SERPL-SCNC: 4.3 MMOL/L — SIGNIFICANT CHANGE UP (ref 3.5–5.3)
RBC # BLD: 3.68 M/UL — LOW (ref 4.2–5.8)
RBC # FLD: 12.7 % — SIGNIFICANT CHANGE UP (ref 10.3–14.5)
SODIUM SERPL-SCNC: 127 MMOL/L — LOW (ref 135–145)
SODIUM SERPL-SCNC: 129 MMOL/L — LOW (ref 135–145)
TROPONIN T, HIGH SENSITIVITY RESULT: 247 NG/L — HIGH (ref 0–51)
VIT B12 SERPL-MCNC: 407 PG/ML — SIGNIFICANT CHANGE UP (ref 232–1245)
WBC # BLD: 14.37 K/UL — HIGH (ref 3.8–10.5)
WBC # FLD AUTO: 14.37 K/UL — HIGH (ref 3.8–10.5)

## 2019-08-24 PROCEDURE — 99233 SBSQ HOSP IP/OBS HIGH 50: CPT

## 2019-08-24 PROCEDURE — 99222 1ST HOSP IP/OBS MODERATE 55: CPT

## 2019-08-24 RX ORDER — LANOLIN ALCOHOL/MO/W.PET/CERES
3 CREAM (GRAM) TOPICAL AT BEDTIME
Refills: 0 | Status: DISCONTINUED | OUTPATIENT
Start: 2019-08-24 | End: 2019-08-26

## 2019-08-24 RX ADMIN — ATORVASTATIN CALCIUM 80 MILLIGRAM(S): 80 TABLET, FILM COATED ORAL at 21:35

## 2019-08-24 RX ADMIN — Medication 5 MILLIGRAM(S): at 09:16

## 2019-08-24 RX ADMIN — Medication 81 MILLIGRAM(S): at 11:38

## 2019-08-24 RX ADMIN — SODIUM CHLORIDE 1 GRAM(S): 9 INJECTION INTRAMUSCULAR; INTRAVENOUS; SUBCUTANEOUS at 13:19

## 2019-08-24 RX ADMIN — Medication 25 MILLIGRAM(S): at 09:15

## 2019-08-24 RX ADMIN — Medication 5 MILLIGRAM(S): at 13:19

## 2019-08-24 RX ADMIN — HEPARIN SODIUM 5000 UNIT(S): 5000 INJECTION INTRAVENOUS; SUBCUTANEOUS at 06:28

## 2019-08-24 RX ADMIN — TICAGRELOR 90 MILLIGRAM(S): 90 TABLET ORAL at 09:16

## 2019-08-24 RX ADMIN — Medication 5 MILLIGRAM(S): at 00:11

## 2019-08-24 RX ADMIN — HEPARIN SODIUM 5000 UNIT(S): 5000 INJECTION INTRAVENOUS; SUBCUTANEOUS at 17:34

## 2019-08-24 RX ADMIN — SODIUM CHLORIDE 1 GRAM(S): 9 INJECTION INTRAMUSCULAR; INTRAVENOUS; SUBCUTANEOUS at 21:34

## 2019-08-24 RX ADMIN — QUETIAPINE FUMARATE 25 MILLIGRAM(S): 200 TABLET, FILM COATED ORAL at 21:35

## 2019-08-24 RX ADMIN — Medication 3 MILLIGRAM(S): at 21:35

## 2019-08-24 RX ADMIN — Medication 5 MILLIGRAM(S): at 21:35

## 2019-08-24 RX ADMIN — LISINOPRIL 2.5 MILLIGRAM(S): 2.5 TABLET ORAL at 09:16

## 2019-08-24 RX ADMIN — Medication 25 MILLIGRAM(S): at 17:34

## 2019-08-24 RX ADMIN — TICAGRELOR 90 MILLIGRAM(S): 90 TABLET ORAL at 17:34

## 2019-08-24 RX ADMIN — TAMSULOSIN HYDROCHLORIDE 0.4 MILLIGRAM(S): 0.4 CAPSULE ORAL at 21:34

## 2019-08-24 NOTE — BEHAVIORAL HEALTH ASSESSMENT NOTE - NSBHCHARTREVIEWLAB_PSY_A_CORE FT
11.2   14.37 )-----------( 311      ( 24 Aug 2019 10:30 )             33.3     08-24    127<L>  |  90<L>  |  26<H>  ----------------------------<  138<H>  3.6   |  23  |  1.39<H>    Ca    9.5      24 Aug 2019 13:03

## 2019-08-24 NOTE — BEHAVIORAL HEALTH ASSESSMENT NOTE - NSBHCHARTREVIEWINVESTIGATE_PSY_A_CORE FT
Ventricular Rate 76 BPM    Atrial Rate 76 BPM    P-R Interval 154 ms    QRS Duration 106 ms    Q-T Interval 360 ms    QTC Calculation(Bezet) 405 ms    P Axis 56 degrees    R Axis 43 degrees    T Axis 19 degrees    Diagnosis Line NORMAL SINUS RHYTHM  POSSIBLE LEFT ATRIAL ENLARGEMENT  POSSIBLE INFERIOR INFARCT, AGE UNDETERMINED  BORDERLINE ECG    Confirmed by MD NIDHI, JUDITH ([id number]) on 8/22/2019 10:13:10 AM

## 2019-08-24 NOTE — BEHAVIORAL HEALTH ASSESSMENT NOTE - SUMMARY
74 year old Ukrainian-speaking male, , domiciled with wife, children, no pphx, possible dementia after stroke, with PMH of HTN, CVA (2017) with no residual deficits and GERD presents from [facility] for an IWSTEMI, consulted for confusion, agitation, hiccups possibly related to psychological issues.  Pt poor historian, lethargic, nonverbal. Pt not answering questions at this time. collateral obtained from wife, pt has been more confused, agitated mostly in evenings, poor sleep. pt has been on seroquel. pt has ongoing hiccups, resistant to treatment, unclear etiology.

## 2019-08-24 NOTE — BEHAVIORAL HEALTH ASSESSMENT NOTE - NSBHCHARTREVIEWVS_PSY_A_CORE FT
Vital Signs Last 24 Hrs  T(C): 36.7 (24 Aug 2019 12:00), Max: 37.9 (23 Aug 2019 20:57)  T(F): 98 (24 Aug 2019 12:00), Max: 100.2 (23 Aug 2019 20:57)  HR: 76 (24 Aug 2019 12:00) (76 - 89)  BP: 121/69 (24 Aug 2019 12:00) (94/50 - 174/79)  BP(mean): --  RR: 18 (24 Aug 2019 12:00) (18 - 18)  SpO2: 100% (24 Aug 2019 12:00) (98% - 100%)

## 2019-08-24 NOTE — PROGRESS NOTE ADULT - SUBJECTIVE AND OBJECTIVE BOX
Patient is a 74y old  Male who presents with a chief complaint of IWSTEMI (23 Aug 2019 16:35)      INTERVAL HPI/OVERNIGHT EVENTS:   74 year old Macedonian-speaking male with PMH of HTN, CVA with no residual neurologic deficits, and GERD presents from [facility] for an IWSTEMI, admitted for ACS protocol and ischemic work up, s/p C ( 8/15)  with 90% pLCX s/p DIAZ x1 and staged PCI to ramus and LAD  ,   TTE was done  with hyperdynamic EF, no LV thrombus, no WMA.  Hospital course was complicated by hyponatremia and Hiccups.  I was called today bc family feels mentation is getting worse and wants to take pt home.            Medications:MEDICATIONS  (STANDING):  aspirin  chewable 81 milliGRAM(s) Oral daily  atorvastatin 80 milliGRAM(s) Oral at bedtime  baclofen 5 milliGRAM(s) Oral three times a day  heparin  Injectable 5000 Unit(s) SubCutaneous every 12 hours  lisinopril 2.5 milliGRAM(s) Oral daily  metoprolol tartrate 25 milliGRAM(s) Oral two times a day  pantoprazole    Tablet 40 milliGRAM(s) Oral before breakfast  QUEtiapine 25 milliGRAM(s) Oral at bedtime  sodium chloride 1 Gram(s) Oral every 8 hours  tamsulosin 0.4 milliGRAM(s) Oral at bedtime  ticagrelor 90 milliGRAM(s) Oral every 12 hours    MEDICATIONS  (PRN):  benzocaine 15 mG/menthol 3.6 mG Lozenge 1 Lozenge Oral every 4 hours PRN Sore Throat  famotidine    Tablet 20 milliGRAM(s) Oral daily PRN reflex, indigestion  melatonin 3 milliGRAM(s) Oral at bedtime PRN Insomnia      Allergies: Allergies    No Known Allergies      REVIEW OF SYSTEMS:  CONSTITUTIONAL: No fever, weight loss, or fatigue  EYES: No eye pain, visual disturbances, or discharge  ENMT:  No difficulty hearing, tinnitus, vertigo; No sinus or throat pain  NECK: No pain or stiffness  BREASTS: No pain, masses, or nipple discharge  RESPIRATORY: No cough, wheezing, chills or hemoptysis; No shortness of breath  CARDIOVASCULAR: No chest pain, palpitations, dizziness, or leg swelling  GASTROINTESTINAL: No abdominal or epigastric pain. No nausea, vomiting, or hematemesis; No diarrhea or constipation. No melena or hematochezia.  GENITOURINARY: No dysuria, frequency, hematuria, or incontinence  NEUROLOGICAL: No headaches, memory loss, loss of strength, numbness, or tremors  SKIN: No itching, burning, rashes, or lesions   LYMPH NODES: No enlarged glands  ENDOCRINE: No heat or cold intolerance; No hair loss  MUSCULOSKELETAL: No joint pain or swelling; No muscle, back, or extremity pain  PSYCHIATRIC: No depression, anxiety, mood swings, or difficulty sleeping  HEME/LYMPH: No easy bruising, or bleeding gums  ALLERY AND IMMUNOLOGIC: No hives or eczema    T(C): 36.4 (08-24-19 @ 08:40), Max: 37.9 (08-23-19 @ 20:57)  HR: 84 (08-24-19 @ 08:40) (78 - 92)  BP: 156/77 (08-24-19 @ 08:40) (94/50 - 174/79)  RR: 18 (08-24-19 @ 08:40) (18 - 18)  SpO2: 100% (08-24-19 @ 08:40) (98% - 100%)  Wt(kg): --Vital Signs Last 24 Hrs  T(C): 36.4 (24 Aug 2019 08:40), Max: 37.9 (23 Aug 2019 20:57)  T(F): 97.5 (24 Aug 2019 08:40), Max: 100.2 (23 Aug 2019 20:57)  HR: 84 (24 Aug 2019 08:40) (78 - 92)  BP: 156/77 (24 Aug 2019 08:40) (94/50 - 174/79)  BP(mean): --  RR: 18 (24 Aug 2019 08:40) (18 - 18)  SpO2: 100% (24 Aug 2019 08:40) (98% - 100%)  I&O's Summary    23 Aug 2019 07:01  -  24 Aug 2019 07:00  --------------------------------------------------------  IN: 850 mL / OUT: 651 mL / NET: 199 mL        PHYSICAL EXAM:  GENERAL: NAD, well-groomed, well-developed  HEAD:  Atraumatic, Normocephalic  EYES: EOMI, PERRLA, conjunctiva and sclera clear  ENMT: No tonsillar erythema, exudates, or enlargement; Moist mucous membranes, Good dentition, No lesions  NECK: Supple, No JVD, Normal thyroid  NERVOUS SYSTEM:  Alert & Oriented X3, Good concentration; Motor Strength 5/5 B/L upper and lower extremities; DTRs 2+ intact and symmetric  CHEST/LUNG: Clear to percussion bilaterally; No rales, rhonchi, wheezing, or rubs  HEART: Regular rate and rhythm; No murmurs, rubs, or gallops  ABDOMEN: Soft, Nontender, Nondistended; Bowel sounds present  EXTREMITIES:  2+ Peripheral Pulses, No clubbing, cyanosis, or edema  LYMPH: No lymphadenopathy noted  SKIN: No rashes or lesions    Consultant(s) Notes Reviewed:  [x ] YES  [ ] NO  Care Discussed with Consultants/Other Providers [ x] YES  [ ] NO  Name of Consultant  LABS:                        10.2   13.05 )-----------( 276      ( 23 Aug 2019 09:44 )             30.1     08-24    129<L>  |  94<L>  |  26<H>  ----------------------------<  183<H>  4.3   |  21<L>  |  1.67<H>    Ca    9.4      24 Aug 2019 06:35          CAPILLARY BLOOD GLUCOSE                RADIOLOGY & ADDITIONAL TESTS:  EKG :     Imaging Personally Reviewed:  [ ] YES  [ ] NO  HEALTH ISSUES - PROBLEM Dx:  Hiccups: Hiccups  Anemia, unspecified type: Anemia, unspecified type  Fever, unspecified fever cause: Fever, unspecified fever cause  Chest pain, atypical: Chest pain, atypical  Groin pain, left: Groin pain, left  Hyponatremia: Hyponatremia  Pre-syncope: Pre-syncope  Need for prophylactic measure: Need for prophylactic measure  GERD (gastroesophageal reflux disease): GERD (gastroesophageal reflux disease)  BPH (benign prostatic hyperplasia): BPH (benign prostatic hyperplasia)  Cerebrovascular accident (CVA): Cerebrovascular accident (CVA)  Agitation: Agitation  Anemia: Anemia  TERRY (acute kidney injury): TERRY (acute kidney injury)  Prophylactic measure: Prophylactic measure  Urinary retention: Urinary retention  Essential hypertension: Essential hypertension  ST elevation myocardial infarction (STEMI) involving other coronary artery of inferior wall: ST elevation myocardial infarction (STEMI) involving other coronary artery of inferior wall Patient is a 74y old  Male who presents with a chief complaint of IWSTEMI (23 Aug 2019 16:35)      INTERVAL HPI/OVERNIGHT EVENTS:   74 year old Swedish-speaking male with PMH of HTN, CVA with no residual neurologic deficits, and GERD presents from [facility] for an IWSTEMI, admitted for ACS protocol and ischemic work up, s/p C ( 8/15)  with 90% pLCX s/p DIAZ x1 and staged PCI to ramus and LAD  ,   TTE was done  with hyperdynamic EF, no LV thrombus, no WMA.  Hospital course was complicated by hyponatremia and Hiccups.  I was called today bc family feels mentation is getting worse and wants to take pt home.      Medications:MEDICATIONS  (STANDING):  aspirin  chewable 81 milliGRAM(s) Oral daily  atorvastatin 80 milliGRAM(s) Oral at bedtime  baclofen 5 milliGRAM(s) Oral three times a day  heparin  Injectable 5000 Unit(s) SubCutaneous every 12 hours  lisinopril 2.5 milliGRAM(s) Oral daily  metoprolol tartrate 25 milliGRAM(s) Oral two times a day  pantoprazole    Tablet 40 milliGRAM(s) Oral before breakfast  QUEtiapine 25 milliGRAM(s) Oral at bedtime  sodium chloride 1 Gram(s) Oral every 8 hours  tamsulosin 0.4 milliGRAM(s) Oral at bedtime  ticagrelor 90 milliGRAM(s) Oral every 12 hours    MEDICATIONS  (PRN):  benzocaine 15 mG/menthol 3.6 mG Lozenge 1 Lozenge Oral every 4 hours PRN Sore Throat  famotidine    Tablet 20 milliGRAM(s) Oral daily PRN reflex, indigestion  melatonin 3 milliGRAM(s) Oral at bedtime PRN Insomnia      Allergies: Allergies    No Known Allergies      REVIEW OF SYSTEMS:  Unable to provide reliable ROS due to mentation       T(C): 36.4 (08-24-19 @ 08:40), Max: 37.9 (08-23-19 @ 20:57)  HR: 84 (08-24-19 @ 08:40) (78 - 92)  BP: 156/77 (08-24-19 @ 08:40) (94/50 - 174/79)  RR: 18 (08-24-19 @ 08:40) (18 - 18)  SpO2: 100% (08-24-19 @ 08:40) (98% - 100%)  Wt(kg): --Vital Signs Last 24 Hrs  T(C): 36.4 (24 Aug 2019 08:40), Max: 37.9 (23 Aug 2019 20:57)  T(F): 97.5 (24 Aug 2019 08:40), Max: 100.2 (23 Aug 2019 20:57)  HR: 84 (24 Aug 2019 08:40) (78 - 92)  BP: 156/77 (24 Aug 2019 08:40) (94/50 - 174/79)  BP(mean): --  RR: 18 (24 Aug 2019 08:40) (18 - 18)  SpO2: 100% (24 Aug 2019 08:40) (98% - 100%)  I&O's Summary    23 Aug 2019 07:01  -  24 Aug 2019 07:00  --------------------------------------------------------  IN: 850 mL / OUT: 651 mL / NET: 199 mL        PHYSICAL EXAM:  GENERAL: NAD, well-groomed, well-developed  HEAD:  Atraumatic, Normocephalic  NECK: Supple, No JVD, Normal thyroid  NERVOUS SYSTEM:  Alert & and responsive  CHEST/LUNG: Clear to percussion bilaterally; No rales, rhonchi, wheezing, or rubs  HEART: Regular rate and rhythm; No murmurs, rubs, or gallops  ABDOMEN: Soft, Nontender, Nondistended; Bowel sounds present  EXTREMITIES:  2+ Peripheral Pulses, No clubbing, cyanosis, or edema      Consultant(s) Notes Reviewed:  [x ] YES  [ ] NO  Care Discussed with Consultants/Other Providers [ x] YES  [ ] NO  Name of Consultant  LABS:                        10.2   13.05 )-----------( 276      ( 23 Aug 2019 09:44 )             30.1     08-24    129<L>  |  94<L>  |  26<H>  ----------------------------<  183<H>  4.3   |  21<L>  |  1.67<H>    Ca    9.4      24 Aug 2019 06:35          CAPILLARY BLOOD GLUCOSE                RADIOLOGY & ADDITIONAL TESTS:  EKG :     Imaging Personally Reviewed:  [ ] YES  [ ] NO  HEALTH ISSUES - PROBLEM Dx:  Hiccups: Hiccups  Anemia, unspecified type: Anemia, unspecified type  Fever, unspecified fever cause: Fever, unspecified fever cause  Chest pain, atypical: Chest pain, atypical  Groin pain, left: Groin pain, left  Hyponatremia: Hyponatremia  Pre-syncope: Pre-syncope  Need for prophylactic measure: Need for prophylactic measure  GERD (gastroesophageal reflux disease): GERD (gastroesophageal reflux disease)  BPH (benign prostatic hyperplasia): BPH (benign prostatic hyperplasia)  Cerebrovascular accident (CVA): Cerebrovascular accident (CVA)  Agitation: Agitation  Anemia: Anemia  TERRY (acute kidney injury): TERRY (acute kidney injury)  Prophylactic measure: Prophylactic measure  Urinary retention: Urinary retention  Essential hypertension: Essential hypertension  ST elevation myocardial infarction (STEMI) involving other coronary artery of inferior wall: ST elevation myocardial infarction (STEMI) involving other coronary artery of inferior wall

## 2019-08-24 NOTE — BEHAVIORAL HEALTH ASSESSMENT NOTE - HPI (INCLUDE ILLNESS QUALITY, SEVERITY, DURATION, TIMING, CONTEXT, MODIFYING FACTORS, ASSOCIATED SIGNS AND SYMPTOMS)
74 year old Mohawk-speaking male, , domiciled with wife, children, no pphx, possible dementia after stroke, with PMH of HTN, CVA (2017) with no residual deficits and GERD presents from [facility] for an IWSTEMI, consulted for confusion, agitation, hiccups possibly related to psychological issues.  Pt poor historian, lethargic, nonverbal. Pt not answering questions at this time.  collateral obtained from wife at bedside. She states pt has been more confused, poor recent memory since the stroke. Pt often talks about events from the past. Pt has been more confused, agitated here in the hospital. Pt would pick at things in the air that are not present, yelling, moaning. no physical aggression towards staff. no depression, psychosis, julianne noted. Pt has been more anxious, worried about his health. she noticed pt has ongoing hiccups the past few days, however stops when he is sleeping. She thought there maybe some underlying psychological component. pt never had continous hiccups in the past. no agitation at home. pt has never seen a psychiatrist in the past. no si/hi expressed. no access to guns at home. pt is able to tend to his ADLs at baseline.

## 2019-08-24 NOTE — BEHAVIORAL HEALTH ASSESSMENT NOTE - NSBHCONSULTMEDS_PSY_A_CORE FT
rec cont seroquel 25mg po q7pm. can inc to 50mg po if agitation persists. can give haldol 1mg po/iv q6hr prn severe agitation. start melatonin 3mg po qhs for insomnia, delirium. check folate, b12, tsh, rpr levels.

## 2019-08-24 NOTE — PROGRESS NOTE ADULT - ASSESSMENT
74 year old Upper sorbian-speaking male with PMH of HTN, CVA with no residual neurologic deficits, and GERD presents from [facility] for an IWSTEMI, admitted for ACS protocol and ischemic work up, s/p LHC with 90% pLCX s/p DIAZ x1 and Staged PCI .

## 2019-08-25 LAB
ANION GAP SERPL CALC-SCNC: 13 MMOL/L — SIGNIFICANT CHANGE UP (ref 5–17)
BASOPHILS # BLD AUTO: 0.05 K/UL — SIGNIFICANT CHANGE UP (ref 0–0.2)
BASOPHILS NFR BLD AUTO: 0.4 % — SIGNIFICANT CHANGE UP (ref 0–2)
BUN SERPL-MCNC: 24 MG/DL — HIGH (ref 7–23)
CALCIUM SERPL-MCNC: 9.6 MG/DL — SIGNIFICANT CHANGE UP (ref 8.4–10.5)
CHLORIDE SERPL-SCNC: 95 MMOL/L — LOW (ref 96–108)
CO2 SERPL-SCNC: 24 MMOL/L — SIGNIFICANT CHANGE UP (ref 22–31)
CREAT SERPL-MCNC: 1.12 MG/DL — SIGNIFICANT CHANGE UP (ref 0.5–1.3)
CULTURE RESULTS: SIGNIFICANT CHANGE UP
CULTURE RESULTS: SIGNIFICANT CHANGE UP
EOSINOPHIL # BLD AUTO: 0.23 K/UL — SIGNIFICANT CHANGE UP (ref 0–0.5)
EOSINOPHIL NFR BLD AUTO: 1.7 % — SIGNIFICANT CHANGE UP (ref 0–6)
GLUCOSE SERPL-MCNC: 209 MG/DL — HIGH (ref 70–99)
HCT VFR BLD CALC: 32.5 % — LOW (ref 39–50)
HGB BLD-MCNC: 11.1 G/DL — LOW (ref 13–17)
IMM GRANULOCYTES NFR BLD AUTO: 1 % — SIGNIFICANT CHANGE UP (ref 0–1.5)
LYMPHOCYTES # BLD AUTO: 0.69 K/UL — LOW (ref 1–3.3)
LYMPHOCYTES # BLD AUTO: 5.2 % — LOW (ref 13–44)
MCHC RBC-ENTMCNC: 31.3 PG — SIGNIFICANT CHANGE UP (ref 27–34)
MCHC RBC-ENTMCNC: 34.2 GM/DL — SIGNIFICANT CHANGE UP (ref 32–36)
MCV RBC AUTO: 91.5 FL — SIGNIFICANT CHANGE UP (ref 80–100)
MONOCYTES # BLD AUTO: 1.02 K/UL — HIGH (ref 0–0.9)
MONOCYTES NFR BLD AUTO: 7.7 % — SIGNIFICANT CHANGE UP (ref 2–14)
NEUTROPHILS # BLD AUTO: 11.15 K/UL — HIGH (ref 1.8–7.4)
NEUTROPHILS NFR BLD AUTO: 84 % — HIGH (ref 43–77)
PLATELET # BLD AUTO: 293 K/UL — SIGNIFICANT CHANGE UP (ref 150–400)
POTASSIUM SERPL-MCNC: 3.6 MMOL/L — SIGNIFICANT CHANGE UP (ref 3.5–5.3)
POTASSIUM SERPL-SCNC: 3.6 MMOL/L — SIGNIFICANT CHANGE UP (ref 3.5–5.3)
RBC # BLD: 3.55 M/UL — LOW (ref 4.2–5.8)
RBC # FLD: 13 % — SIGNIFICANT CHANGE UP (ref 10.3–14.5)
SODIUM SERPL-SCNC: 132 MMOL/L — LOW (ref 135–145)
SPECIMEN SOURCE: SIGNIFICANT CHANGE UP
SPECIMEN SOURCE: SIGNIFICANT CHANGE UP
TROPONIN T, HIGH SENSITIVITY RESULT: 167 NG/L — HIGH (ref 0–51)
WBC # BLD: 13.27 K/UL — HIGH (ref 3.8–10.5)
WBC # FLD AUTO: 13.27 K/UL — HIGH (ref 3.8–10.5)

## 2019-08-25 PROCEDURE — 99233 SBSQ HOSP IP/OBS HIGH 50: CPT

## 2019-08-25 PROCEDURE — 99232 SBSQ HOSP IP/OBS MODERATE 35: CPT

## 2019-08-25 RX ORDER — BACLOFEN 100 %
10 POWDER (GRAM) MISCELLANEOUS THREE TIMES A DAY
Refills: 0 | Status: DISCONTINUED | OUTPATIENT
Start: 2019-08-25 | End: 2019-08-26

## 2019-08-25 RX ORDER — DOCUSATE SODIUM 100 MG
100 CAPSULE ORAL
Refills: 0 | Status: DISCONTINUED | OUTPATIENT
Start: 2019-08-25 | End: 2019-08-26

## 2019-08-25 RX ORDER — SENNA PLUS 8.6 MG/1
2 TABLET ORAL AT BEDTIME
Refills: 0 | Status: DISCONTINUED | OUTPATIENT
Start: 2019-08-25 | End: 2019-08-26

## 2019-08-25 RX ADMIN — TICAGRELOR 90 MILLIGRAM(S): 90 TABLET ORAL at 05:40

## 2019-08-25 RX ADMIN — HEPARIN SODIUM 5000 UNIT(S): 5000 INJECTION INTRAVENOUS; SUBCUTANEOUS at 05:40

## 2019-08-25 RX ADMIN — PANTOPRAZOLE SODIUM 40 MILLIGRAM(S): 20 TABLET, DELAYED RELEASE ORAL at 05:40

## 2019-08-25 RX ADMIN — TAMSULOSIN HYDROCHLORIDE 0.4 MILLIGRAM(S): 0.4 CAPSULE ORAL at 21:34

## 2019-08-25 RX ADMIN — QUETIAPINE FUMARATE 25 MILLIGRAM(S): 200 TABLET, FILM COATED ORAL at 21:34

## 2019-08-25 RX ADMIN — Medication 5 MILLIGRAM(S): at 05:39

## 2019-08-25 RX ADMIN — Medication 25 MILLIGRAM(S): at 05:39

## 2019-08-25 RX ADMIN — Medication 100 MILLIGRAM(S): at 17:07

## 2019-08-25 RX ADMIN — SODIUM CHLORIDE 1 GRAM(S): 9 INJECTION INTRAMUSCULAR; INTRAVENOUS; SUBCUTANEOUS at 21:34

## 2019-08-25 RX ADMIN — ATORVASTATIN CALCIUM 80 MILLIGRAM(S): 80 TABLET, FILM COATED ORAL at 21:34

## 2019-08-25 RX ADMIN — HEPARIN SODIUM 5000 UNIT(S): 5000 INJECTION INTRAVENOUS; SUBCUTANEOUS at 17:07

## 2019-08-25 RX ADMIN — TICAGRELOR 90 MILLIGRAM(S): 90 TABLET ORAL at 17:07

## 2019-08-25 RX ADMIN — Medication 10 MILLIGRAM(S): at 13:35

## 2019-08-25 RX ADMIN — Medication 3 MILLIGRAM(S): at 22:45

## 2019-08-25 RX ADMIN — SENNA PLUS 2 TABLET(S): 8.6 TABLET ORAL at 21:34

## 2019-08-25 RX ADMIN — SODIUM CHLORIDE 1 GRAM(S): 9 INJECTION INTRAMUSCULAR; INTRAVENOUS; SUBCUTANEOUS at 05:39

## 2019-08-25 RX ADMIN — Medication 25 MILLIGRAM(S): at 17:07

## 2019-08-25 RX ADMIN — LISINOPRIL 2.5 MILLIGRAM(S): 2.5 TABLET ORAL at 05:39

## 2019-08-25 RX ADMIN — Medication 81 MILLIGRAM(S): at 11:39

## 2019-08-25 RX ADMIN — SODIUM CHLORIDE 1 GRAM(S): 9 INJECTION INTRAMUSCULAR; INTRAVENOUS; SUBCUTANEOUS at 13:35

## 2019-08-25 RX ADMIN — Medication 10 MILLIGRAM(S): at 21:34

## 2019-08-25 NOTE — PROGRESS NOTE BEHAVIORAL HEALTH - NSBHCHARTREVIEWVS_PSY_A_CORE FT
Vital Signs Last 24 Hrs  T(C): 36.6 (25 Aug 2019 04:37), Max: 36.7 (24 Aug 2019 12:00)  T(F): 97.8 (25 Aug 2019 04:37), Max: 98 (24 Aug 2019 12:00)  HR: 97 (25 Aug 2019 04:37) (61 - 97)  BP: 106/66 (25 Aug 2019 04:37) (104/67 - 121/69)  BP(mean): --  RR: 18 (25 Aug 2019 04:37) (18 - 18)  SpO2: 96% (25 Aug 2019 04:37) (95% - 100%)

## 2019-08-25 NOTE — PROGRESS NOTE ADULT - SUBJECTIVE AND OBJECTIVE BOX
Patient is a 74y old  Male who presents with a chief complaint of IWSTEMI (25 Aug 2019 09:58)      INTERVAL HPI/OVERNIGHT EVENTS:    Patient was seen this Am with wife at the bedside.  Still with the hiccup.      Medications:MEDICATIONS  (STANDING):  aspirin  chewable 81 milliGRAM(s) Oral daily  atorvastatin 80 milliGRAM(s) Oral at bedtime  baclofen 10 milliGRAM(s) Oral three times a day  docusate sodium 100 milliGRAM(s) Oral two times a day  heparin  Injectable 5000 Unit(s) SubCutaneous every 12 hours  lisinopril 2.5 milliGRAM(s) Oral daily  melatonin 3 milliGRAM(s) Oral at bedtime  metoprolol tartrate 25 milliGRAM(s) Oral two times a day  pantoprazole    Tablet 40 milliGRAM(s) Oral before breakfast  QUEtiapine 25 milliGRAM(s) Oral at bedtime  senna 2 Tablet(s) Oral at bedtime  sodium chloride 1 Gram(s) Oral every 8 hours  tamsulosin 0.4 milliGRAM(s) Oral at bedtime  ticagrelor 90 milliGRAM(s) Oral every 12 hours    MEDICATIONS  (PRN):  benzocaine 15 mG/menthol 3.6 mG Lozenge 1 Lozenge Oral every 4 hours PRN Sore Throat  famotidine    Tablet 20 milliGRAM(s) Oral daily PRN reflex, indigestion      Allergies: Allergies    No Known Allergies        REVIEW OF SYSTEMS: Unable to provide       T(C): 36.6 (08-25-19 @ 11:40), Max: 36.6 (08-24-19 @ 20:14)  HR: 91 (08-25-19 @ 11:40) (61 - 97)  BP: 154/82 (08-25-19 @ 11:40) (104/67 - 154/82)  RR: 18 (08-25-19 @ 11:40) (18 - 18)  SpO2: 99% (08-25-19 @ 11:40) (95% - 99%)  Wt(kg): --Vital Signs Last 24 Hrs  T(C): 36.6 (25 Aug 2019 11:40), Max: 36.6 (24 Aug 2019 20:14)  T(F): 97.9 (25 Aug 2019 11:40), Max: 97.9 (24 Aug 2019 20:14)  HR: 91 (25 Aug 2019 11:40) (61 - 97)  BP: 154/82 (25 Aug 2019 11:40) (104/67 - 154/82)  BP(mean): --  RR: 18 (25 Aug 2019 11:40) (18 - 18)  SpO2: 99% (25 Aug 2019 11:40) (95% - 99%)  I&O's Summary    24 Aug 2019 07:01  -  25 Aug 2019 07:00  --------------------------------------------------------  IN: 710 mL / OUT: 0 mL / NET: 710 mL    25 Aug 2019 07:01  -  25 Aug 2019 16:06  --------------------------------------------------------  IN: 470 mL / OUT: 300 mL / NET: 170 mL          PHYSICAL EXAM:  GENERAL: NAD, well-groomed, well-developed  HEAD:  Atraumatic, Normocephalic  NECK: Supple, No JVD, Normal thyroid  NERVOUS SYSTEM:  Alert & and responsive  CHEST/LUNG: Clear to percussion bilaterally; No rales, rhonchi, wheezing, or rubs  HEART: Regular rate and rhythm; No murmurs, rubs, or gallops  ABDOMEN: Soft, Nontender, Nondistended; Bowel sounds present  EXTREMITIES:  2+ Peripheral Pulses, No clubbing, cyanosis, or edema    Consultant(s) Notes Reviewed:  [x ] YES  [ ] NO  Care Discussed with Consultants/Other Providers [ x] YES  [ ] NO  Name of Consultant  LABS:                        11.1   13.27 )-----------( 293      ( 25 Aug 2019 08:48 )             32.5     08-25    132<L>  |  95<L>  |  24<H>  ----------------------------<  209<H>  3.6   |  24  |  1.12    Ca    9.6      25 Aug 2019 06:57          CAPILLARY BLOOD GLUCOSE                RADIOLOGY & ADDITIONAL TESTS:  EKG :     Imaging Personally Reviewed:  [ ] YES  [ ] NO  HEALTH ISSUES - PROBLEM Dx:  Vascular dementia without behavioral disturbance  Delirium due to another medical condition  Hiccups: Hiccups  Anemia, unspecified type: Anemia, unspecified type  Fever, unspecified fever cause: Fever, unspecified fever cause  Chest pain, atypical: Chest pain, atypical  Groin pain, left: Groin pain, left  Hyponatremia: Hyponatremia  Pre-syncope: Pre-syncope  Need for prophylactic measure: Need for prophylactic measure  GERD (gastroesophageal reflux disease): GERD (gastroesophageal reflux disease)  BPH (benign prostatic hyperplasia): BPH (benign prostatic hyperplasia)  Cerebrovascular accident (CVA): Cerebrovascular accident (CVA)  Agitation: Agitation  Anemia: Anemia  TERRY (acute kidney injury): TERRY (acute kidney injury)  Prophylactic measure: Prophylactic measure  Urinary retention: Urinary retention  Essential hypertension: Essential hypertension  ST elevation myocardial infarction (STEMI) involving other coronary artery of inferior wall: ST elevation myocardial infarction (STEMI) involving other coronary artery of inferior wall

## 2019-08-25 NOTE — PROGRESS NOTE BEHAVIORAL HEALTH - NSBHFUPINTERVALHXFT_PSY_A_CORE
pt Divehi speaking, used  ID [id number], pt mostly uncooperative with interview, confused. pt denies anxiety, depression. no si/hi. Pt couldn't explain recent events. wife at bedside, no concerns for safety. no prns given last night,

## 2019-08-25 NOTE — PROGRESS NOTE BEHAVIORAL HEALTH - SUMMARY
74 year old Divehi-speaking male, , domiciled with wife, children, no pphx, possible dementia after stroke, with PMH of HTN, CVA (2017) with no residual deficits and GERD presents from [facility] for an IWSTEMI, consulted for confusion, agitation, hiccups possibly related to psychological issues.  Pt poor historian, lethargic, nonverbal. Pt not answering questions at this time. collateral obtained from wife, pt has been more confused, agitated mostly in evenings, poor sleep. pt has been on seroquel. pt has ongoing hiccups, resistant to treatment, unclear etiology.

## 2019-08-25 NOTE — PROGRESS NOTE ADULT - ATTENDING COMMENTS
S/p stent to the lad. groin without hematoma
Adalgisa Wade   Hospitalist   
Spoke to daughter at bedside, Once cardiology clears after trending trop, Na+ is  corrected and hiccups is better will d/c then
Care to be assumed by day hospitalist at 8 am.  This patient was assigned to me by the hospitalist in charge; my involvment in this case has consisted of the initial history, physical, chart review, and management plan of the patient after arriving to the general medicine floor after transfer from CCU.   This patient was previously unknown to me.
Spoke to daughter at bedside the plan of care
Family wants patient to see psych because they mentions that patient has been having issues with his mentation but has been deteriorating mostly at night and the hiccup could also be due to worsening mentation and delirium and as per daughter , patient is getting more and more agitated.  Psych is paged, awaiting response.         Adalgisa Wade   Hospitalist   
checked with Lauren martin covered by insurance so no need to change to plavix    Hgb repeat 11.8 from 11.5, no s/s of bleeding, HD stable  discussed with cardio, rec starting metoprolol 12.5mg bid with holding parameters  repeat cbc in AM or more frequent if change in HD status or develops any symptoms

## 2019-08-25 NOTE — PROGRESS NOTE BEHAVIORAL HEALTH - NSBHCHARTREVIEWLAB_PSY_A_CORE FT
11.1   13.27 )-----------( 293      ( 25 Aug 2019 08:48 )             32.5     08-25    132<L>  |  95<L>  |  24<H>  ----------------------------<  209<H>  3.6   |  24  |  1.12    Ca    9.6      25 Aug 2019 06:57

## 2019-08-25 NOTE — PROGRESS NOTE ADULT - PROBLEM SELECTOR PLAN 3
Low grade fever for many days with no localizing size. Leukocytosis without localizing symptoms.   - Blood and Urine CX are negative   - CXR, doppler ext negative  - cont to closely monitor
Fever 100.4 2-3 days ago. Leukocytosis without localizing symptoms.   - Cultures drawn and pending results  - CXR, doppler ext negative
Fever 100.4, leukocytosis without localizing symptoms.   - Cultures drawn and pending results  - CXR negative  - Leukocytosis could be post-intervention, however, will continue to monitor and r/o infection  - Will monitor off abx for now
Hgb 11.5 from 15.2, didn't get IVF, groin access site looks fine, no back or abd pain. Possible from AM core lab vs stat lab draww?   -repeat cbc now, if downtrending needs ct abd pelvis to r/o RP bleed  -c/w DAPT for now
Hgb stable ranging from 10-11.  He is asymptomatic. No back, abdominal pain  - c/w DAPT for now  - Continue to monitor
Hgb stable yesterday, now down to 10.3 from stable 11 yesterday and 15 on admission.   He is asymptomatic. No back, abdominal pain  - Will repeat STAT. If continues to down-trend with pursue stat CT A/P and make cardiology aware  - c/w DAPT for now
Na 127 from 129. Has been eating less per family  - off IVF. Osm of serum and urine ordered
-madrid removed for TOV  -bladder scan in am if no void
Fever 100.4 2-3 days ago. Leukocytosis without localizing symptoms.   - Blood and Urine CX are negative   - CXR, doppler ext negative  - cont to closely monitor
Hgb stable ranging from 10-11.  He is asymptomatic. No back, abdominal pain  - c/w DAPT for now  - Continue to monitor
Resolved. Cr 1.11  Likely TERRY from hypovolemia, dehydration, anemia,  - Monitor bmp  - Avoid nephrotoxic medications

## 2019-08-25 NOTE — PROGRESS NOTE BEHAVIORAL HEALTH - NSBHATTESTSEENBY_PSY_A_CORE
Hpi Title: Evaluation of a Skin Lesion How Severe Are Your Spot(S)?: mild Have Your Spot(S) Been Treated In The Past?: has not been treated attending Psychiatrist without NP/Trainee

## 2019-08-25 NOTE — PROGRESS NOTE BEHAVIORAL HEALTH - NSBHCHARTREVIEWINVESTIGATE_PSY_A_CORE FT
Ventricular Rate 76 BPM    Atrial Rate 76 BPM    P-R Interval 154 ms    QRS Duration 106 ms    Q-T Interval 360 ms    QTC Calculation(Bezet) 405 ms    P Axis 56 degrees    R Axis 43 degrees    T Axis 19 degrees    Diagnosis Line NORMAL SINUS RHYTHM  POSSIBLE LEFT ATRIAL ENLARGEMENT  POSSIBLE INFERIOR INFARCT, AGE UNDETERMINED  BORDERLINE ECG    Confirmed by MD NIDHI, JUDITH ([id number]) on 8/22/2019 10:13:10 AM

## 2019-08-26 ENCOUNTER — TRANSCRIPTION ENCOUNTER (OUTPATIENT)
Age: 74
End: 2019-08-26

## 2019-08-26 VITALS
RESPIRATION RATE: 18 BRPM | HEART RATE: 75 BPM | DIASTOLIC BLOOD PRESSURE: 80 MMHG | SYSTOLIC BLOOD PRESSURE: 144 MMHG | TEMPERATURE: 100 F | OXYGEN SATURATION: 94 %

## 2019-08-26 PROCEDURE — 82746 ASSAY OF FOLIC ACID SERUM: CPT

## 2019-08-26 PROCEDURE — 71045 X-RAY EXAM CHEST 1 VIEW: CPT

## 2019-08-26 PROCEDURE — C1887: CPT

## 2019-08-26 PROCEDURE — 83036 HEMOGLOBIN GLYCOSYLATED A1C: CPT

## 2019-08-26 PROCEDURE — 84550 ASSAY OF BLOOD/URIC ACID: CPT

## 2019-08-26 PROCEDURE — 80053 COMPREHEN METABOLIC PANEL: CPT

## 2019-08-26 PROCEDURE — 86900 BLOOD TYPING SEROLOGIC ABO: CPT

## 2019-08-26 PROCEDURE — 82962 GLUCOSE BLOOD TEST: CPT

## 2019-08-26 PROCEDURE — 82553 CREATINE MB FRACTION: CPT

## 2019-08-26 PROCEDURE — 83930 ASSAY OF BLOOD OSMOLALITY: CPT

## 2019-08-26 PROCEDURE — 84484 ASSAY OF TROPONIN QUANT: CPT

## 2019-08-26 PROCEDURE — 80048 BASIC METABOLIC PNL TOTAL CA: CPT

## 2019-08-26 PROCEDURE — 82570 ASSAY OF URINE CREATININE: CPT

## 2019-08-26 PROCEDURE — C8929: CPT

## 2019-08-26 PROCEDURE — 97161 PT EVAL LOW COMPLEX 20 MIN: CPT

## 2019-08-26 PROCEDURE — C1769: CPT

## 2019-08-26 PROCEDURE — 80061 LIPID PANEL: CPT

## 2019-08-26 PROCEDURE — 93458 L HRT ARTERY/VENTRICLE ANGIO: CPT | Mod: 59

## 2019-08-26 PROCEDURE — 85610 PROTHROMBIN TIME: CPT

## 2019-08-26 PROCEDURE — 86803 HEPATITIS C AB TEST: CPT

## 2019-08-26 PROCEDURE — 93005 ELECTROCARDIOGRAM TRACING: CPT

## 2019-08-26 PROCEDURE — C1874: CPT

## 2019-08-26 PROCEDURE — 83935 ASSAY OF URINE OSMOLALITY: CPT

## 2019-08-26 PROCEDURE — C9606: CPT | Mod: LC

## 2019-08-26 PROCEDURE — 84100 ASSAY OF PHOSPHORUS: CPT

## 2019-08-26 PROCEDURE — 85027 COMPLETE CBC AUTOMATED: CPT

## 2019-08-26 PROCEDURE — 99152 MOD SED SAME PHYS/QHP 5/>YRS: CPT

## 2019-08-26 PROCEDURE — 86901 BLOOD TYPING SEROLOGIC RH(D): CPT

## 2019-08-26 PROCEDURE — 93970 EXTREMITY STUDY: CPT

## 2019-08-26 PROCEDURE — 81001 URINALYSIS AUTO W/SCOPE: CPT

## 2019-08-26 PROCEDURE — 85730 THROMBOPLASTIN TIME PARTIAL: CPT

## 2019-08-26 PROCEDURE — 86850 RBC ANTIBODY SCREEN: CPT

## 2019-08-26 PROCEDURE — 84443 ASSAY THYROID STIM HORMONE: CPT

## 2019-08-26 PROCEDURE — C1894: CPT

## 2019-08-26 PROCEDURE — 87040 BLOOD CULTURE FOR BACTERIA: CPT

## 2019-08-26 PROCEDURE — C9601: CPT | Mod: LD

## 2019-08-26 PROCEDURE — C9600: CPT | Mod: LD

## 2019-08-26 PROCEDURE — 82550 ASSAY OF CK (CPK): CPT

## 2019-08-26 PROCEDURE — 99239 HOSP IP/OBS DSCHRG MGMT >30: CPT

## 2019-08-26 PROCEDURE — 87086 URINE CULTURE/COLONY COUNT: CPT

## 2019-08-26 PROCEDURE — 82607 VITAMIN B-12: CPT

## 2019-08-26 PROCEDURE — 84300 ASSAY OF URINE SODIUM: CPT

## 2019-08-26 PROCEDURE — 83735 ASSAY OF MAGNESIUM: CPT

## 2019-08-26 RX ORDER — LISINOPRIL 2.5 MG/1
1 TABLET ORAL
Qty: 30 | Refills: 0
Start: 2019-08-26 | End: 2019-09-24

## 2019-08-26 RX ORDER — METOPROLOL TARTRATE 50 MG
1 TABLET ORAL
Qty: 30 | Refills: 0
Start: 2019-08-26 | End: 2019-09-24

## 2019-08-26 RX ORDER — TAMSULOSIN HYDROCHLORIDE 0.4 MG/1
1 CAPSULE ORAL
Qty: 30 | Refills: 0
Start: 2019-08-26 | End: 2019-09-24

## 2019-08-26 RX ORDER — AMLODIPINE BESYLATE 2.5 MG/1
1 TABLET ORAL
Qty: 0 | Refills: 0 | DISCHARGE

## 2019-08-26 RX ORDER — PANTOPRAZOLE SODIUM 20 MG/1
1 TABLET, DELAYED RELEASE ORAL
Qty: 30 | Refills: 0
Start: 2019-08-26 | End: 2019-09-24

## 2019-08-26 RX ORDER — ATORVASTATIN CALCIUM 80 MG/1
1 TABLET, FILM COATED ORAL
Qty: 30 | Refills: 0
Start: 2019-08-26 | End: 2019-09-24

## 2019-08-26 RX ORDER — ASPIRIN/CALCIUM CARB/MAGNESIUM 324 MG
1 TABLET ORAL
Qty: 30 | Refills: 0
Start: 2019-08-26 | End: 2019-09-24

## 2019-08-26 RX ADMIN — Medication 10 MILLIGRAM(S): at 09:15

## 2019-08-26 RX ADMIN — SODIUM CHLORIDE 1 GRAM(S): 9 INJECTION INTRAMUSCULAR; INTRAVENOUS; SUBCUTANEOUS at 09:17

## 2019-08-26 RX ADMIN — Medication 25 MILLIGRAM(S): at 09:15

## 2019-08-26 RX ADMIN — Medication 81 MILLIGRAM(S): at 09:16

## 2019-08-26 RX ADMIN — TICAGRELOR 90 MILLIGRAM(S): 90 TABLET ORAL at 09:15

## 2019-08-26 RX ADMIN — LISINOPRIL 2.5 MILLIGRAM(S): 2.5 TABLET ORAL at 09:15

## 2019-08-26 RX ADMIN — HEPARIN SODIUM 5000 UNIT(S): 5000 INJECTION INTRAVENOUS; SUBCUTANEOUS at 06:34

## 2019-08-26 NOTE — PROVIDER CONTACT NOTE (OTHER) - SITUATION
Pt. refusing medications.  Pt. refusing to eat or swallow anything.  Pt. has dementia.  Pt. is Estonian speaking but is not alert.

## 2019-08-26 NOTE — PROGRESS NOTE ADULT - PROBLEM SELECTOR PROBLEM 7
GERD (gastroesophageal reflux disease)
Agitation
Cerebrovascular accident (CVA)
GERD (gastroesophageal reflux disease)
Groin pain, left

## 2019-08-26 NOTE — PROGRESS NOTE ADULT - PROBLEM SELECTOR PLAN 4
128. Likely hypovolemic.   - Sent Urine Osm, lytes  - Will give IVF empirically
Na 132. Osmolalities seems reflects SIADH although uric acid level normal.  - CW Fluid restriction 1L/d, salt tabs
resolved, afebrile, had low grade fever prior  -mild leukocytosis but no s/s of infection  - Blood and Urine Cx negative   - CXR, doppler LE negative
Continues to remain agitated, particularly overnight. He has underlying dementia (per daughter).  - Delirium precautions: frequent reorientation, maintain day-night cycle  - NO BENZO  - Melatonin 3mg qhs prn, will add seroquel 25 mg qPM and PRN Haldol for agitation
Orthostatics and urine studies consistent with hypovolemia.   - Given additional volume yesterday and today with improvement in orthostatics today
Resolved. Cr 1.1-1.2  Likely TERRY from hypovolemia, dehydration, anemia,  - Monitor bmp  - Avoid nephrotoxic medications
was agitated post cath requiring haldol/xanax and overnight. Has underlying dementia per his daughter  - Delirium precautions: frequent reorientation, maintain day-night cycle  - NO BENZO  - melatonin 3mg qhs prn, will add seroquel 25 mg qPM and PRN Haldol for agitation
was agitated post cath requiring haldol/xanax but stable not agitated this AM  -reorient as needed  -melatonin 3mg qhs prn
Na 129. Osmolalities seems reflects SIADH although uric acid level normal.  - will ordered Fluid restriction 1L/d, salt tabs after having the repeat creatinine
hsq
Na 129. Osmolalities seems reflects SIADH although uric acid level normal.  - Fluid restriction 1L/d, salt tabs
Continues to remain agitated, particularly overnight. He has underlying dementia (per daughter).  - Delirium precautions: frequent reorientation, maintain day-night cycle  - NO BENZO  - Melatonin 3mg qhs prn, will add seroquel 25 mg qPM and PRN Haldol for agitation

## 2019-08-26 NOTE — PROGRESS NOTE ADULT - PROBLEM SELECTOR PROBLEM 4
Hyponatremia
Fever, unspecified fever cause
Hyponatremia
Pre-syncope
Agitation
TERRY (acute kidney injury)
Hyponatremia
Prophylactic measure
Agitation
Hyponatremia

## 2019-08-26 NOTE — DISCHARGE NOTE NURSING/CASE MANAGEMENT/SOCIAL WORK - NSDCDPATPORTLINK_GEN_ALL_CORE
You can access the "[employer]"[facility] Patient Portal, offered by [facility], by registering with the following website: [email or web address]

## 2019-08-26 NOTE — PROGRESS NOTE ADULT - PROBLEM SELECTOR PLAN 5
hx of CVA,   - c/w antiplatelet and statin as above
Site looks appropriate without hematoma. no bruit.  Blood counts are stable.   - To discuss with cardiology to see if there is any other further work up required.
hx of CVA,   - c/w antiplatelet and statin as above    11. BPH: Continue flomax      DVT: SQH  Disposition: Pending cardiology recs re: ongoing CP, and resolution of fever, downtrending leukocytosis and resolution of electrolyte abn.  PT recommendation: HOME
hx of CVA,   - c/w antiplatelet and statin as above    11. BPH: Continue flomax      DVT: SQH  Disposition: Pending cardiology recs re: ongoing CP, and resolution of fever, downtrending leukocytosis and resolution of electrolyte abn.  PT recommendation: HOME
resolved likely due to contrast from cath and poor po intake, improved with IVF,   -creatinine back to baseline
Resolved. Cr 1.1-1.2  Likely TERRY from hypovolemia, dehydration, anemia,  - Monitor bmp  - Avoid nephrotoxic medications
hx of CVA,   - c/w antiplatelet and statin as above
hx of CVA,   - c/w antiplatelet and statin as above
hx of CVA,   - c/w antiplatelet and statin as above    11. BPH: Continue flomax      DVT: SQH  Disposition: Pending cardiology recs re: ongoing CP, and resolution of fever, downtrending leukocytosis and resolution of electrolyte abn.  PT recommendation: HOME
hx of CVA,   c/w antiplatelet and statin as above
hx of CVA,   - c/w antiplatelet and statin as above    11. BPH: Continue flomax      DVT: SQH  Disposition: Pending cardiology recs re: ongoing CP, and resolution of fever, downtrending leukocytosis and resolution of electrolyte abn.  PT recommendation: HOME

## 2019-08-26 NOTE — PROGRESS NOTE ADULT - PROBLEM SELECTOR PLAN 6
- c/w flomax
Hgb stable ranging from 10-11.  He is asymptomatic. No back, abdominal pain  - c/w DAPT for now  - Continue to monitor
creatinine has been fluctuating   with improved Creatinine
improving to 132 today from marcelo of 127 likely due to poor po intake, asymptomatic   - encourage po intake  - outpatient follwup
- c/w flomax
- c/w flomax
Trending up with IVF. Will continue to monitor closely.
c/w flomax
creatinine has been fluctuating   will get bladder scan and repeat BMP this afternoon

## 2019-08-26 NOTE — PROGRESS NOTE ADULT - PROBLEM SELECTOR PROBLEM 2
Chest pain, atypical
Hiccups
Fever, unspecified fever cause
Hiccups
TERRY (acute kidney injury)
Essential hypertension
Hiccups
TERRY (acute kidney injury)
Hiccups
Pre-syncope

## 2019-08-26 NOTE — PROGRESS NOTE ADULT - PROBLEM SELECTOR PLAN 7
hx of CVA,   - c/w antiplatelet and statin as above    11. BPH: Continue flomax      DVT: SQH  Disposition: Pending cardiology recs re: ongoing CP, and resolution of fever, downtrending leukocytosis and resolution of electrolyte abn.  PT recommendation: HOME hx of CVA,   - c/w antiplatelet and statin as above

## 2019-08-26 NOTE — PROVIDER CONTACT NOTE (OTHER) - ACTION/TREATMENT ORDERED:
PA aware, EKG Reviewed. Will continue to monitor for further interventions.
PA aware. Will continue to monitor pt on tele &will continue meds as ordered.
awaiting PA assessment
tylenol  blood cultures  cxray
Continue to monitor pt closely.
Discussed with PA, to follow up with patient and family

## 2019-08-26 NOTE — PROGRESS NOTE ADULT - PROBLEM SELECTOR PROBLEM 8
Need for prophylactic measure
Cerebrovascular accident (CVA)
Need for prophylactic measure
Agitation
Need for prophylactic measure

## 2019-08-26 NOTE — PROGRESS NOTE ADULT - ASSESSMENT
74 year old French-speaking male with PMH of HTN, CVA with no residual neurologic deficits, and GERD presents from [facility] for an IWSTEMI, admitted for ACS protocol and ischemic work up, s/p LHC with 90% pLCX s/p DIAZ x1 and Staged PCI c/b TERRY, hyponatremia.

## 2019-08-26 NOTE — PROGRESS NOTE ADULT - PROVIDER SPECIALTY LIST ADULT
Cardiology
Hospitalist
Internal Medicine
Intervent Cardiology
Intervent Cardiology
Hospitalist
Cardiology
Internal Medicine

## 2019-08-26 NOTE — PROGRESS NOTE ADULT - PROBLEM SELECTOR PLAN 8
dvt ppx: HSQ  Dispo: discharge home today with outpatient cards and PCP followup with 1-2 weeks  spent 45 min on discharge process time bph: c/w flomax  dvt ppx: HSQ  Dispo: discharge home today with outpatient cards and PCP followup with 1-2 weeks  spent 45 min on discharge process time

## 2019-08-26 NOTE — PROGRESS NOTE ADULT - PROBLEM SELECTOR PROBLEM 1
ST elevation myocardial infarction (STEMI) involving other coronary artery of inferior wall

## 2019-08-26 NOTE — PROGRESS NOTE ADULT - REASON FOR ADMISSION
IWSTEMI

## 2019-08-26 NOTE — PROGRESS NOTE ADULT - PROBLEM SELECTOR PROBLEM 5
Cerebrovascular accident (CVA)
Groin pain, left
Cerebrovascular accident (CVA)
Cerebrovascular accident (CVA)
TERRY (acute kidney injury)
Cerebrovascular accident (CVA)
TERRY (acute kidney injury)
Cerebrovascular accident (CVA)

## 2019-08-26 NOTE — PROGRESS NOTE ADULT - SUBJECTIVE AND OBJECTIVE BOX
Patient is a 74y old  Male who presents with a chief complaint of IWSTEMI (25 Aug 2019 09:58)    Daughter and son in law bedside translating    SUBJECTIVE / OVERNIGHT EVENTS: No overnight events. tooks his meds a little late today. Asmita cp, sob, palpitations, leg swelling. Hiccups resolved. No agitation. Wants to go home today    Tele reviewed: sinus 60-70    MEDICATIONS  (STANDING):  aspirin  chewable 81 milliGRAM(s) Oral daily  atorvastatin 80 milliGRAM(s) Oral at bedtime  baclofen 10 milliGRAM(s) Oral three times a day  docusate sodium 100 milliGRAM(s) Oral two times a day  heparin  Injectable 5000 Unit(s) SubCutaneous every 12 hours  lisinopril 2.5 milliGRAM(s) Oral daily  melatonin 3 milliGRAM(s) Oral at bedtime  metoprolol tartrate 25 milliGRAM(s) Oral two times a day  pantoprazole    Tablet 40 milliGRAM(s) Oral before breakfast  QUEtiapine 25 milliGRAM(s) Oral at bedtime  senna 2 Tablet(s) Oral at bedtime  tamsulosin 0.4 milliGRAM(s) Oral at bedtime  ticagrelor 90 milliGRAM(s) Oral every 12 hours    MEDICATIONS  (PRN):  benzocaine 15 mG/menthol 3.6 mG Lozenge 1 Lozenge Oral every 4 hours PRN Sore Throat  famotidine    Tablet 20 milliGRAM(s) Oral daily PRN reflex, indigestion      Vital Signs Last 24 Hrs  T(C): 36.9 (26 Aug 2019 04:22), Max: 37.1 (25 Aug 2019 20:35)  T(F): 98.4 (26 Aug 2019 04:22), Max: 98.8 (25 Aug 2019 20:35)  HR: 101 (26 Aug 2019 04:22) (82 - 101)  BP: 111/70 (26 Aug 2019 04:22) (111/70 - 163/81)  BP(mean): --  RR: 18 (26 Aug 2019 04:22) (18 - 18)  SpO2: 98% (26 Aug 2019 04:22) (96% - 100%)  CAPILLARY BLOOD GLUCOSE        I&O's Summary    25 Aug 2019 07:01  -  26 Aug 2019 07:00  --------------------------------------------------------  IN: 470 mL / OUT: 300 mL / NET: 170 mL        PHYSICAL EXAM:  GENERAL: NAD, well-developed in chair  HEAD:  Atraumatic, Normocephalic, no hiccups noted  NECK: Supple, No JVD  CHEST/LUNG: Clear to auscultation bilaterally; No wheeze  HEART: Regular rate and rhythm;   ABDOMEN: Soft, Nontender, Nondistended; Bowel sounds present  EXTREMITIES:  2+ Peripheral Pulses, No clubbing, cyanosis, or edema  PSYCH: AAOx1  NEUROLOGY: non-focal      LABS:                        11.1   13.27 )-----------( 293      ( 25 Aug 2019 08:48 )             32.5     08-25    132<L>  |  95<L>  |  24<H>  ----------------------------<  209<H>  3.6   |  24  |  1.12    Ca    9.6      25 Aug 2019 06:57                    RADIOLOGY & ADDITIONAL TESTS:    Imaging Personally Reviewed:    Consultant(s) Notes Reviewed:  all    Care Discussed with Consultants/Other Providers:

## 2019-08-26 NOTE — DISCHARGE NOTE NURSING/CASE MANAGEMENT/SOCIAL WORK - NSDCPEPTSTRK_GEN_ALL_CORE
Prescribed medications/Risk factors for stroke/Stroke warning signs and symptoms/Signs and symptoms of stroke/Stroke education booklet/Call 911 for stroke/Stroke support groups for patients, families, and friends/Need for follow up after discharge

## 2019-08-26 NOTE — PROGRESS NOTE ADULT - PROBLEM SELECTOR PROBLEM 6
BPH (benign prostatic hyperplasia)
Anemia
TERRY (acute kidney injury)
Hyponatremia
BPH (benign prostatic hyperplasia)
Hyponatremia
TERRY (acute kidney injury)

## 2019-08-26 NOTE — PROGRESS NOTE ADULT - PROBLEM SELECTOR PLAN 1
No c/o chest pain, SOB. Has been having hiccups. TTE with hyperdynamic EF, no LV thrombus, no WMA. s/p cath 8/15 with DIAZ to pLCX, S/P staged PCI to ramus and LAD   - Troponin is elevated since PCI. Now trop 220. Cardiology re-evaluated, Wants to trend  - c/w DAPT, statin, metoprolol and Lisinopril  ** spoke to Daughter at bedside
Transfer for [facility] for an IWSTEMI downgraded from CCU  TTE with hyperdynamic EF, no LV thrombus, no WMA  s/p cath 8/15 with DIAZ to pLCX  S/P staged PCI to ramus and LAD   - c/w aspirin 81mg daily and Brillianta (Note: If pts insurance does not cover Brilinta he should be switched to Plavix If this is needed he will need 600 mg of Plavix 12 hours after last Brilinta dose, following by 75 mg daily)  - metoprolol as below  - Lisinopril added
-s/p cath 8/15 with DIAZ to pLCX, S/P staged PCI to ramus and LAD   -TTE with hyperdynamic EF, no LV thrombus, no WMA.  - Trops post cath with GI symptoms likely post cath per cards, trops downtrended, no cp,  - c/w DAPT (enforced compliance with family, statin)  - change metoprolol to toprol 50mg ER daily and c/w Lisinopril 2.5   spoke to Daughter over the phone
Transfer for [facility] for an IWSTEMI downgraded from CCU  TTE with hyperdynamic EF, no LV thrombus, no WMA  s/p cath 8/15 with DIAZ to pLCX  - plan for staged PCI to ramus and LAD on Monday, given TERRY, start IVF per cards  - c/w aspirin 81mg daily and Brillianta (Note: If pts insurance does not cover Brilinta he should be switched to Plavix If this is needed he will need 600 mg of Plavix 12 hours after last Brilinta dose, following by 75 mg daily)  - cards rec metoprolol 12.5mg bid
Transfer for [facility] for an IWSTEMI downgraded from CCU  TTE with hyperdynamic EF, no LV thrombus, no WMA  - Cardiology evaluated, plan noted  - s/p cath 8/15 with DIAZ to pLCX, S/P staged PCI to ramus and LAD   - c/w DAPT, statin, metoprolol and Lisinopril
Transfer for [facility] for an IWSTEMI downgraded from CCU  TTE with hyperdynamic EF, no LV thrombus, no WMA  s/p cath 8/15 with DIAZ to pLCX  - plan for staged PCI to ramus and LAD on Monday, given TERRY, start IVF per cards  - c/w aspirin 81mg daily and Brillianta (Note: If pts insurance does not cover Brilinta he should be switched to Plavix If this is needed he will need 600 mg of Plavix 12 hours after last Brilinta dose, following by 75 mg daily)  - cards rec metoprolol 12.5mg bid
Transfer for [facility] for an IWSTEMI downgraded from CCU  -s/p cath yesterday with DIAZ to pLCX  -plan for staged PCI to ramus and LAD on Monday, given TERRY, start IVF per cards  -c/w aspirin 81mg daily and Brilliant   --If pts insurance does not cover Brilinta he should be switched to Plavix If this is needed he will need 600 mg of Plavix 12 hours after last Brilinta dose, following by 75 mg daily, will f/u with pharmacy  -TTE with hyperdynamic EF, no LV thrombus, no WMA  --cards rec metoprolol 12.5mg bid but will hold off until repeat cbc given anemia
-C today showing 90% pLCX s/p DIAZ x1, 90% LAD, 90% Ramus, possibly pending staged PCI.  -off hep gtt; sheath removed. Noted prior hematoma R groin expressed; neuro vasc intact after pressure held.  -c/w asa  -c/w statin  -c/w brilinta  -holding BB for intermittent bradycardia; currently NSR 80s on monitor, if stable overnight can consider starting low dose metoprolol 12.5 bid in am  -holding ace-i for staged pci plan to start low dose lisinopril 2.5 post cath.
Has been having hiccups, it is noted that patient has hiccup when awake and once resting comfortably, the hiccup resolves. TTE with hyperdynamic EF, no LV thrombus, no WMA. s/p cath 8/15 with DIAZ to pLCX, S/P staged PCI to ramus and LAD   - Troponin is elevated since PCI which could be post vascularization.   Now trop 247 with normal CK and CKMB . Will discuss with cardio about any next plan of care  - c/w DAPT, statin, metoprolol and Lisinopril  ** spoke to Daughter and son in Law at bedside
Transfer for [facility] for an IWSTEMI downgraded from CCU  TTE with hyperdynamic EF, no LV thrombus, no WMA  s/p cath 8/15 with DIAZ to pLCX  - S/P staged PCI to ramus and LAD   - c/w aspirin 81mg daily and Brillianta (Note: If pts insurance does not cover Brilinta he should be switched to Plavix If this is needed he will need 600 mg of Plavix 12 hours after last Brilinta dose, following by 75 mg daily)  - metoprolol 12.5mg bid  - He is on bedrest at this time. Will follow up with cardiology with any final recommendations
Has been having hiccups, it is noted that patient has hiccup when awake and once resting comfortably, the hiccup resolves. TTE with hyperdynamic EF, no LV thrombus, no WMA. s/p cath 8/15 with DIAZ to pLCX, S/P staged PCI to ramus and LAD   - Troponin is elevated since PCI which could be post vascularization but is on downward trend.  Hs trop  247--> 167 with normal CK and CKMB . If there is no plan for further work , might anticipate home DC tomorrow .   - c/w DAPT, statin, metoprolol and Lisinopril   spoke to Daughter over the phone
Transfer for [facility] for an IWSTEMI downgraded from CCU  TTE with hyperdynamic EF, no LV thrombus, no WMA  s/p cath 8/15 with DIAZ to pLCX  - S/P staged PCI to ramus and LAD   - c/w aspirin 81mg daily and Brillianta (Note: If pts insurance does not cover Brilinta he should be switched to Plavix If this is needed he will need 600 mg of Plavix 12 hours after last Brilinta dose, following by 75 mg daily)  - metoprolol 12.5mg bid  - He is on bedrest at this time. Will follow up with cardiology with any final recommendations

## 2019-08-26 NOTE — PROGRESS NOTE ADULT - PROBLEM SELECTOR PLAN 2
Unclear if true angina or GERD, as self resolved and was not similar to pain that brought him in. However, considering presentation, will ensure he is CP free at time of discharge  - Up- titrated metoprolol and added lisinopril; decrease demand and afterload  - Dr. Longoria made aware, he will come evaluate this afternoon  - Troponin, CKMB sent and we will trend. Though he just had a cath, will be difficult to interpret these values
Unclear, has no h/ Pulmonary disease, Likely from ACS and hyponatremia. Trial with Reglan , PPI didn't help/ Spoke to house GI. They recommended Baclofen or Ativan prn  - Baclofen 5mg tid added. Might need Ativan at low dose
Creatinine improved today: Likely TERRY from hypovolemia, dehydration, anemia,  - Per cards, start NS @ 75cc/hr  - Monitor bmp  - Avoid nephrotoxic medications
Creatinine improved today: Likely TERRY from hypovolemia, dehydration, anemia,  - Per cards, start NS @ 75cc/hr  - Monitor bmp  - Avoid nephrotoxic medications
Fever 100.4 yesterday, leukocytosis without localizing symptoms.   - Cultures drawn and pending results  - CXR negative
ct uptrending to 1.5 to 1.1 unknown baseline, suspect likely due to contrast, hypovolemia, dehydration, anemia,  -per cards, start NS @ 75cc/hr  -monitor bmp
resolved likely related to GERD  -no need for further baclofen  -c/w ppi, h2 blocker prn
-sbp at goal for now  -plan to eventually start metoprolol and lisinopril as noted  -can additionally resume CCB if SBP poorly controlled in am
Unclear, has no h/ Pulmonary disease, Likely from ACS and hyponatremia. Trial with Reglan , PPI didn't help/ Spoke to house GI. They recommended Baclofen or Ativan prn  - Baclofen 5mg tid added.  -  Might need Ativan at low dose
Likely TERRY from hypovolemia, dehydration, anemia,  - Monitor bmp  - Avoid nephrotoxic medications
Unclear, has no h/ Pulmonary disease, Likely from ACS and hyponatremia. Trial with Reglan , PPI didn't help/ Spoke to house GI. They recommended Baclofen or Ativan prn  - Baclofen is increased from 5mg to 10 three times a day and monitor   -  Might need Ativan at low dose
Likely orthostatic based on history from ? hypovolemia from not eating prior to cath and barely eating or drinking anything after (based on history obtained from family). His labs also show hyponatremia with ? hemoconcentration on CBC c/w hypovolemia  - Will give 500 cc fluids now and reassess  - Urine studies for hyponatremia  - Will obtain orthostatics

## 2019-08-30 PROBLEM — I10 ESSENTIAL (PRIMARY) HYPERTENSION: Chronic | Status: ACTIVE | Noted: 2019-08-15

## 2019-08-30 PROBLEM — K21.9 GASTRO-ESOPHAGEAL REFLUX DISEASE WITHOUT ESOPHAGITIS: Chronic | Status: ACTIVE | Noted: 2019-08-15

## 2019-08-30 PROBLEM — I63.9 CEREBRAL INFARCTION, UNSPECIFIED: Chronic | Status: ACTIVE | Noted: 2019-08-15

## 2019-09-01 ENCOUNTER — OUTPATIENT (OUTPATIENT)
Dept: OUTPATIENT SERVICES | Facility: HOSPITAL | Age: 74
LOS: 1 days | End: 2019-09-01

## 2019-09-03 PROBLEM — Z00.00 ENCOUNTER FOR PREVENTIVE HEALTH EXAMINATION: Status: ACTIVE | Noted: 2019-09-03

## 2019-09-04 ENCOUNTER — OUTPATIENT (OUTPATIENT)
Dept: OUTPATIENT SERVICES | Facility: HOSPITAL | Age: 74
LOS: 1 days | End: 2019-09-04
Payer: MEDICAID

## 2019-09-04 ENCOUNTER — APPOINTMENT (OUTPATIENT)
Dept: CARDIOLOGY | Facility: HOSPITAL | Age: 74
End: 2019-09-04

## 2019-09-04 VITALS
HEIGHT: 60 IN | DIASTOLIC BLOOD PRESSURE: 81 MMHG | SYSTOLIC BLOOD PRESSURE: 136 MMHG | HEART RATE: 53 BPM | BODY MASS INDEX: 21.6 KG/M2 | WEIGHT: 110 LBS | OXYGEN SATURATION: 98 %

## 2019-09-04 DIAGNOSIS — Z86.73 PERSONAL HISTORY OF TRANSIENT ISCHEMIC ATTACK (TIA), AND CEREBRAL INFARCTION W/OUT RESIDUAL DEFICITS: ICD-10-CM

## 2019-09-04 DIAGNOSIS — I10 ESSENTIAL (PRIMARY) HYPERTENSION: ICD-10-CM

## 2019-09-04 DIAGNOSIS — I25.10 ATHEROSCLEROTIC HEART DISEASE OF NATIVE CORONARY ARTERY WITHOUT ANGINA PECTORIS: ICD-10-CM

## 2019-09-04 DIAGNOSIS — I25.10 ATHEROSCLEROTIC HEART DISEASE OF NATIVE CORONARY ARTERY W/OUT ANGINA PECTORIS: ICD-10-CM

## 2019-09-04 DIAGNOSIS — E78.5 HYPERLIPIDEMIA, UNSPECIFIED: ICD-10-CM

## 2019-09-04 PROCEDURE — 93005 ELECTROCARDIOGRAM TRACING: CPT

## 2019-09-04 PROCEDURE — G0463: CPT

## 2019-09-04 RX ORDER — KRILL/OM-3/DHA/EPA/PHOSPHO/AST 1000-230MG
81 CAPSULE ORAL DAILY
Qty: 90 | Refills: 3 | Status: ACTIVE | COMMUNITY
Start: 2019-09-04 | End: 1900-01-01

## 2019-09-04 RX ORDER — LISINOPRIL 2.5 MG/1
2.5 TABLET ORAL DAILY
Qty: 90 | Refills: 3 | Status: ACTIVE | COMMUNITY
Start: 2019-09-04 | End: 1900-01-01

## 2019-09-04 RX ORDER — TICAGRELOR 90 MG/1
90 TABLET ORAL TWICE DAILY
Qty: 180 | Refills: 3 | Status: ACTIVE | COMMUNITY
Start: 2019-09-04 | End: 1900-01-01

## 2019-09-04 RX ORDER — METOPROLOL SUCCINATE 50 MG/1
50 TABLET, EXTENDED RELEASE ORAL DAILY
Qty: 90 | Refills: 3 | Status: ACTIVE | COMMUNITY
Start: 2019-09-04 | End: 1900-01-01

## 2019-09-04 RX ORDER — ATORVASTATIN CALCIUM 80 MG/1
80 TABLET, FILM COATED ORAL
Qty: 90 | Refills: 3 | Status: ACTIVE | COMMUNITY
Start: 2019-09-04 | End: 1900-01-01

## 2019-09-04 NOTE — REVIEW OF SYSTEMS
[Shortness Of Breath] : no shortness of breath [Chest Pain] : chest pain [Lower Ext Edema] : no extremity edema [Palpitations] : no palpitations [Negative] : Heme/Lymph

## 2019-09-04 NOTE — DISCUSSION/SUMMARY
[FreeTextEntry1] : 73 yo M with HTN, HLD, CVA w/ no residual deficits, IWSTEMI s/p DIAZ to LCx, LAD, D1 in 8/2019 who presents for follow up after discharge.\par \par IWSTEMI s/p cath\par - s/p stent to LCx, staging to LAD, D1\par - echo w/ hyperdynamic LV, no thrombus; euvolemic on exam\par - continues to have chest pain most consistent with costochondritis; pinpoint pain w/ palpation; no anginal symptoms, no further work up necessary\par - c/w asa, brilinta, atorva 80, toprol 50, lis 2.5\par - on maximally tolerated therapy\par \par Discussed with Dr. Edwards.\par \par Patient has a prior cardiologist he will follow up with hereafter.\par \par RTC prn.\par \par Maximiliano Carpenter MD\par Cardiology Fellow\par

## 2019-09-04 NOTE — HISTORY OF PRESENT ILLNESS
[FreeTextEntry1] : Cath 8/2019\par - DIAZ to pLCX\par - staged PCI to D1 and LAD\par \par Echo 8/2019 \par - hyperdynamic EF, no LV thrombus, no WMA. \par \par EKG 9/4/19\par - NSR, early repol changes in inferior and pre-cordial leads

## 2019-09-04 NOTE — PHYSICAL EXAM
[Normal Appearance] : normal appearance [General Appearance - Well Developed] : well developed [General Appearance - Well Nourished] : well nourished [Normal Conjunctiva] : the conjunctiva exhibited no abnormalities [Normal Oral Mucosa] : normal oral mucosa [Heart Rate And Rhythm] : heart rate and rhythm were normal [Arterial Pulses Normal] : the arterial pulses were normal [Heart Sounds] : normal S1 and S2 [] : no respiratory distress [Respiration, Rhythm And Depth] : normal respiratory rhythm and effort [Auscultation Breath Sounds / Voice Sounds] : lungs were clear to auscultation bilaterally [Abdomen Soft] : soft [Abdomen Tenderness] : non-tender [Abnormal Walk] : normal gait [FreeTextEntry1] : no LUTHER [Oriented To Time, Place, And Person] : oriented to person, place, and time

## 2019-09-04 NOTE — REASON FOR VISIT
[Follow-Up - From Hospitalization] : follow-up of a recent hospitalization for [Coronary Artery Disease] : coronary artery disease [FreeTextEntry1] : 73 yo M with HTN, HLD, CVA w/ no residual deficits, IWSTEMI s/p DIAZ to LCx, LAD, D1 in 8/2019 who presents for follow up after discharge.\par \par Patient's son present for translation. Notes that patient has been complaining of L sided chest pain since discharge. Pain is similar to symptoms that brought him to the hospital but much less severe. He has the pain a couple of times a day. Not related to exertion, eating, deep breaths or position changes. He walks around quite a bit at home and does not always get the pain. Pain is pressure like in a pinpoint location on left side of chest. Denies SOB, palp, dizziness, LUTHER, orthopnea, PND. [Family Member] : family member

## 2019-09-05 DIAGNOSIS — I10 ESSENTIAL (PRIMARY) HYPERTENSION: ICD-10-CM

## 2019-09-05 DIAGNOSIS — E78.5 HYPERLIPIDEMIA, UNSPECIFIED: ICD-10-CM

## 2019-10-04 DIAGNOSIS — Z71.89 OTHER SPECIFIED COUNSELING: ICD-10-CM

## 2019-11-06 ENCOUNTER — APPOINTMENT (OUTPATIENT)
Dept: CARDIOLOGY | Facility: HOSPITAL | Age: 74
End: 2019-11-06

## 2021-08-11 NOTE — PROGRESS NOTE ADULT - NSHPATTENDINGPLANDISCUSS_GEN_ALL_CORE
Plan discussed with cardiology fellow, Dr. Xie; patient seen and examined.       I was physically present for the key portions of the evaluation and management (E/M) service provided.    I agree with the above history, physical, and plan which I have reviewed and edited where appropriate.
I have reviewed and confirmed nurses' notes...
medicine np
NP Cindy and daughter
Cardiology
Cardiology
Medicine NP, family (wife) at bedside
LAKEISHA Moreno
LAKEISHA Shannon, daughter, son in law bedside
Lillie Saha and daughter bedside
Medicine NP, daughter at bedside
aura SUAZO and daughter at bedside

## 2024-03-08 NOTE — H&P ADULT - NSHPREVIEWOFSYSTEMS_GEN_ALL_CORE
REVIEW OF SYSTEMS:  CONSTITUTIONAL: No fever, chills, night sweats, or fatigue  EYES: No eye pain, visual disturbances, or discharge  ENT:  No difficulty hearing, tinnitus, vertigo; No sinus or throat pain  NECK: No pain or stiffness  RESPIRATORY: No cough, wheezing, or hemoptysis; No shortness of breath  CARDIOVASCULAR: SEE HPI  GASTROINTESTINAL: No abdominal or epigastric pain. No nausea, vomiting, or hematemesis; No diarrhea or constipation. No melena or hematochezia.  GENITOURINARY: No dysuria, frequency, hematuria, or incontinence  NEUROLOGICAL: No headaches, memory loss, loss of strength, numbness, or tremors  SKIN: No itching, burning, rashes, or lesions   LYMPH NODES: No enlarged glands  ENDOCRINE: No hot or cold intolerance; No hair loss  MUSCULOSKELETAL: No joint pain or swelling; No muscle, back, or extremity pain  PSYCHIATRIC: No depression, anxiety, mood swings, or difficulty sleeping  HEME/LYMPH: No easy bruising, or bleeding gums  ALLERGY AND IMMUNOLOGIC: No hives or eczema
Normal

## 2024-07-16 NOTE — PROGRESS NOTE ADULT - ASSESSMENT
----- Message from Mono Orellana sent at 7/16/2024  9:11 AM EDT -----  Regarding: [facility] Referral Request  [facility] Referral Request    Reason for referral request: Lab/Test Order    Specialist/Lab/Test patient is requesting (if known): Blood work    Specialist Phone Number (if applicable):    Additional Information PT called in to get an order for him to get an order for his blood work before the appointment ( August 7, 2024) PT want the referral to be fax in the [facility] in the [address]  --------------------------------------------------------------------------------------------------------------------------    Relationship to Patient: Self     Call Back Information: OK to leave message on voicemail  Preferred Call Back Number: Phone [phone] (home)        74 year old Telugu-speaking male with PMH of HTN, CVA with no residual neurologic deficits, and GERD presents from [facility] for an IWSTEMI, admitted for ACS protocol and ischemic work up, s/p LHC with 90% pLCX s/p DIAZ x1 and Staged PCI .

## 2025-03-28 NOTE — PROGRESS NOTE ADULT - PROBLEM SELECTOR PROBLEM 3
Fever, unspecified fever cause
Anemia
GERD (gastroesophageal reflux disease)
Hyponatremia
Fever, unspecified fever cause
Urinary retention
Anemia
TERYR (acute kidney injury)
normal/clear to auscultation bilaterally/no wheezes/no rales/no rhonchi